# Patient Record
Sex: MALE | Race: WHITE | Employment: OTHER | URBAN - METROPOLITAN AREA
[De-identification: names, ages, dates, MRNs, and addresses within clinical notes are randomized per-mention and may not be internally consistent; named-entity substitution may affect disease eponyms.]

---

## 2017-01-06 ENCOUNTER — HOSPITAL ENCOUNTER (OUTPATIENT)
Dept: CT IMAGING | Age: 77
Discharge: HOME | End: 2017-01-06
Attending: RADIOLOGY
Payer: MEDICARE

## 2017-01-06 DIAGNOSIS — I71.40 AAA (ABDOMINAL AORTIC ANEURYSM): ICD-10-CM

## 2017-01-06 LAB — CREAT BLD-MCNC: 1.4 MG/DL (ref 0.6–1)

## 2017-01-06 PROCEDURE — 82565 ASSAY OF CREATININE: CPT

## 2017-01-06 PROCEDURE — 74011000258 HC RX REV CODE- 258: Performed by: RADIOLOGY

## 2017-01-06 PROCEDURE — 74174 CTA ABD&PLVS W/CONTRAST: CPT

## 2017-01-06 PROCEDURE — 74011636320 HC RX REV CODE- 636/320: Performed by: RADIOLOGY

## 2017-01-06 RX ORDER — SODIUM CHLORIDE 0.9 % (FLUSH) 0.9 %
10 SYRINGE (ML) INJECTION
Status: COMPLETED | OUTPATIENT
Start: 2017-01-06 | End: 2017-01-06

## 2017-01-06 RX ADMIN — SODIUM CHLORIDE 100 ML: 900 INJECTION, SOLUTION INTRAVENOUS at 10:57

## 2017-01-06 RX ADMIN — IOPAMIDOL 100 ML: 755 INJECTION, SOLUTION INTRAVENOUS at 10:57

## 2017-01-06 RX ADMIN — Medication 10 ML: at 10:57

## 2017-01-06 NOTE — PROGRESS NOTES
ORAL HYDRATION PROTOCOL INSTRUCTIONS GIVEN  It is recommended to discontinue metformin for 48hrs after patient receives I.V. Iodinated contrast for CT scan & to repeat creatnine level prior to restarting metformin. The  physician will need to order lab & instruct patient on when to resume metformin.

## 2017-06-16 ENCOUNTER — HOSPITAL ENCOUNTER (OUTPATIENT)
Dept: CT IMAGING | Age: 77
Discharge: HOME | End: 2017-06-16
Attending: RADIOLOGY
Payer: MEDICARE

## 2017-06-16 DIAGNOSIS — I71.40 AAA (ABDOMINAL AORTIC ANEURYSM): ICD-10-CM

## 2017-06-16 LAB — CREAT BLD-MCNC: 1.1 MG/DL (ref 0.6–1)

## 2017-06-16 PROCEDURE — 74011636320 HC RX REV CODE- 636/320: Performed by: RADIOLOGY

## 2017-06-16 PROCEDURE — 74011000258 HC RX REV CODE- 258: Performed by: RADIOLOGY

## 2017-06-16 PROCEDURE — 82565 ASSAY OF CREATININE: CPT

## 2017-06-16 PROCEDURE — 74174 CTA ABD&PLVS W/CONTRAST: CPT

## 2017-06-16 RX ORDER — SODIUM CHLORIDE 0.9 % (FLUSH) 0.9 %
10 SYRINGE (ML) INJECTION
Status: COMPLETED | OUTPATIENT
Start: 2017-06-16 | End: 2017-06-16

## 2017-06-16 RX ADMIN — SODIUM CHLORIDE 100 ML: 900 INJECTION, SOLUTION INTRAVENOUS at 12:34

## 2017-06-16 RX ADMIN — IOPAMIDOL 100 ML: 755 INJECTION, SOLUTION INTRAVENOUS at 12:34

## 2017-06-16 RX ADMIN — Medication 10 ML: at 12:34

## 2017-11-08 PROBLEM — M95.0 ACQUIRED DEFORMITY OF NOSE: Status: ACTIVE | Noted: 2017-08-30

## 2017-12-04 ENCOUNTER — HOSPITAL ENCOUNTER (OUTPATIENT)
Dept: LAB | Age: 77
Discharge: HOME | End: 2017-12-04
Payer: MEDICARE

## 2017-12-04 DIAGNOSIS — N18.2 CHRONIC KIDNEY DISEASE (CKD), STAGE II (MILD): ICD-10-CM

## 2017-12-04 PROBLEM — E78.2 MIXED HYPERLIPIDEMIA: Status: ACTIVE | Noted: 2017-12-04

## 2017-12-04 PROBLEM — Z95.0 BIVENTRICULAR CARDIAC PACEMAKER IN SITU: Status: ACTIVE | Noted: 2017-12-04

## 2017-12-04 PROBLEM — E11.9 TYPE 2 DIABETES MELLITUS WITHOUT COMPLICATION, WITHOUT LONG-TERM CURRENT USE OF INSULIN (HCC): Status: ACTIVE | Noted: 2017-12-04

## 2017-12-04 LAB
ANION GAP SERPL CALC-SCNC: 5 MMOL/L
BUN SERPL-MCNC: 18 MG/DL (ref 8–23)
CALCIUM SERPL-MCNC: 8.4 MG/DL (ref 8.3–10.4)
CHLORIDE SERPL-SCNC: 103 MMOL/L (ref 98–107)
CO2 SERPL-SCNC: 30 MMOL/L (ref 21–32)
CREAT SERPL-MCNC: 1.4 MG/DL (ref 0.8–1.5)
GLUCOSE SERPL-MCNC: 223 MG/DL (ref 65–100)
POTASSIUM SERPL-SCNC: 4 MMOL/L (ref 3.5–5.1)
SODIUM SERPL-SCNC: 138 MMOL/L (ref 136–145)

## 2017-12-04 PROCEDURE — 80048 BASIC METABOLIC PNL TOTAL CA: CPT | Performed by: INTERNAL MEDICINE

## 2017-12-04 PROCEDURE — 36415 COLL VENOUS BLD VENIPUNCTURE: CPT | Performed by: INTERNAL MEDICINE

## 2017-12-21 PROBLEM — E11.21 TYPE 2 DIABETES MELLITUS WITH NEPHROPATHY (HCC): Status: ACTIVE | Noted: 2017-12-21

## 2017-12-21 PROBLEM — R06.02 SOB (SHORTNESS OF BREATH): Status: ACTIVE | Noted: 2017-12-21

## 2017-12-26 ENCOUNTER — HOSPITAL ENCOUNTER (OUTPATIENT)
Dept: GENERAL RADIOLOGY | Age: 77
Discharge: HOME | End: 2017-12-26
Attending: INTERNAL MEDICINE
Payer: MEDICARE

## 2017-12-26 DIAGNOSIS — R06.02 SOB (SHORTNESS OF BREATH): ICD-10-CM

## 2017-12-26 PROCEDURE — 71020 XR CHEST PA LAT: CPT

## 2018-02-20 ENCOUNTER — HOSPITAL ENCOUNTER (OUTPATIENT)
Dept: GENERAL RADIOLOGY | Age: 78
Discharge: HOME | End: 2018-02-20
Payer: MEDICARE

## 2018-02-20 DIAGNOSIS — J40 BRONCHITIS: ICD-10-CM

## 2018-02-20 PROCEDURE — 71046 X-RAY EXAM CHEST 2 VIEWS: CPT

## 2018-06-13 ENCOUNTER — HOSPITAL ENCOUNTER (OUTPATIENT)
Dept: CT IMAGING | Age: 78
Discharge: HOME | End: 2018-06-13
Attending: RADIOLOGY
Payer: MEDICARE

## 2018-06-13 DIAGNOSIS — I71.40 ABDOMINAL AORTIC ANEURYSM: ICD-10-CM

## 2018-06-13 LAB — CREAT BLD-MCNC: 1.1 MG/DL (ref 0.8–1.5)

## 2018-06-13 PROCEDURE — 74174 CTA ABD&PLVS W/CONTRAST: CPT

## 2018-06-13 PROCEDURE — 74011636320 HC RX REV CODE- 636/320: Performed by: RADIOLOGY

## 2018-06-13 PROCEDURE — 74011000258 HC RX REV CODE- 258: Performed by: RADIOLOGY

## 2018-06-13 PROCEDURE — 82565 ASSAY OF CREATININE: CPT

## 2018-06-13 RX ORDER — SODIUM CHLORIDE 0.9 % (FLUSH) 0.9 %
10 SYRINGE (ML) INJECTION
Status: COMPLETED | OUTPATIENT
Start: 2018-06-13 | End: 2018-06-13

## 2018-06-13 RX ADMIN — SODIUM CHLORIDE 100 ML: 900 INJECTION, SOLUTION INTRAVENOUS at 09:35

## 2018-06-13 RX ADMIN — Medication 10 ML: at 09:35

## 2018-06-13 RX ADMIN — IOPAMIDOL 100 ML: 755 INJECTION, SOLUTION INTRAVENOUS at 09:35

## 2018-06-20 PROBLEM — J44.9 CHRONIC OBSTRUCTIVE PULMONARY DISEASE (HCC): Status: ACTIVE | Noted: 2018-06-20

## 2018-06-20 PROBLEM — Z95.0 PACEMAKER: Status: ACTIVE | Noted: 2018-06-20

## 2018-06-20 PROBLEM — E11.9 TYPE 2 DIABETES MELLITUS WITHOUT COMPLICATION, WITHOUT LONG-TERM CURRENT USE OF INSULIN (HCC): Status: RESOLVED | Noted: 2017-12-04 | Resolved: 2018-06-20

## 2018-10-25 PROBLEM — M48.062 SPINAL STENOSIS OF LUMBAR REGION WITH NEUROGENIC CLAUDICATION: Status: ACTIVE | Noted: 2018-10-25

## 2018-11-12 ENCOUNTER — HOSPITAL ENCOUNTER (OUTPATIENT)
Dept: INTERVENTIONAL RADIOLOGY/VASCULAR | Age: 78
Discharge: HOME | End: 2018-11-12
Attending: RADIOLOGY
Payer: MEDICARE

## 2018-11-12 VITALS
TEMPERATURE: 97.9 F | OXYGEN SATURATION: 94 % | RESPIRATION RATE: 16 BRPM | DIASTOLIC BLOOD PRESSURE: 69 MMHG | HEART RATE: 70 BPM | WEIGHT: 186 LBS | BODY MASS INDEX: 25.94 KG/M2 | SYSTOLIC BLOOD PRESSURE: 121 MMHG

## 2018-11-12 DIAGNOSIS — M54.50 LUMBAR BACK PAIN: ICD-10-CM

## 2018-11-12 LAB
GLUCOSE BLD STRIP.AUTO-MCNC: 185 MG/DL (ref 65–100)
INR BLD: 1.1 (ref 0.9–1.2)
PT BLD: 12.9 SECS (ref 9.6–11.6)

## 2018-11-12 PROCEDURE — 77030003666 HC NDL SPINAL BD -A

## 2018-11-12 PROCEDURE — 62323 NJX INTERLAMINAR LMBR/SAC: CPT

## 2018-11-12 PROCEDURE — 85610 PROTHROMBIN TIME: CPT

## 2018-11-12 PROCEDURE — 74011250636 HC RX REV CODE- 250/636: Performed by: RADIOLOGY

## 2018-11-12 PROCEDURE — 82962 GLUCOSE BLOOD TEST: CPT

## 2018-11-12 PROCEDURE — 74011636320 HC RX REV CODE- 636/320: Performed by: RADIOLOGY

## 2018-11-12 RX ORDER — METAXALONE 800 MG/1
800 TABLET ORAL AS NEEDED
COMMUNITY
End: 2019-05-28

## 2018-11-12 RX ORDER — METHYLPREDNISOLONE ACETATE 40 MG/ML
80 INJECTION, SUSPENSION INTRA-ARTICULAR; INTRALESIONAL; INTRAMUSCULAR; SOFT TISSUE ONCE
Status: COMPLETED | OUTPATIENT
Start: 2018-11-12 | End: 2018-11-12

## 2018-11-12 RX ORDER — LIDOCAINE HYDROCHLORIDE 10 MG/ML
1-20 INJECTION, SOLUTION EPIDURAL; INFILTRATION; INTRACAUDAL; PERINEURAL
Status: DISCONTINUED | OUTPATIENT
Start: 2018-11-12 | End: 2018-11-16 | Stop reason: HOSPADM

## 2018-11-12 RX ADMIN — METHYLPREDNISOLONE ACETATE 80 MG: 40 INJECTION, SUSPENSION INTRA-ARTICULAR; INTRALESIONAL; INTRAMUSCULAR; SOFT TISSUE at 14:38

## 2018-11-12 RX ADMIN — Medication 8 ML: at 14:34

## 2018-11-12 RX ADMIN — IOPAMIDOL 1 ML: 408 INJECTION, SOLUTION INTRATHECAL at 14:38

## 2018-11-12 NOTE — PROCEDURES
Department of Interventional Radiology  [phone]        Interventional Radiology Brief Procedure Note    Patient: Carine Dickens MRN: [id number]  SSN: [id number]    YOB: 1940  Age: [de-identified] y.o. Sex: male      Date of Procedure: 11/12/2018    Pre-Procedure Diagnosis: LBP w RLE radiculopathy. L45 DDD. Post-Procedure Diagnosis: SAME    Procedure(s): Epidural Steroid Injection    Brief Description of Procedure: R L45    Performed By: Ct Nino MD     Assistants: None    Anesthesia:Lidocaine    Estimated Blood Loss: None    Specimens:  None    Implants:  None    Findings: Good epidural contrast visualization. Complications: None    Recommendations: Discharge home. Follow Up: Repeat LUCIE in 2 weeks--scheduled. If no improvement, consider referral to Dr Kareen Santizo. May need a CT myelogram, eventually.       Signed By: Ct Nino MD     November 12, 2018

## 2018-11-12 NOTE — DISCHARGE INSTRUCTIONS
[address]  Department of Interventional Radiology  [facility]  [phone] Office  [phone] Fax    Steroid Injection Discharge Instructions    General Information:   A steroid injection was performed today, placing a combination of a steroid and an anesthetic (numbing medicine) into the space around the nerves of your spine. This is done to treat back pain. It may take 7-10 days for the injection to reach its full potential.  This procedure can be done at any level of the spinal column, depending on where your pain is. Your doctor will have ordered the appropriate level to be treated prior to your coming in for the procedure. Home Care Instructions: You can resume your regular diet. Do not drink alcohol. You may notice that you have to use your pain medications less after your injection. Some people do not notice much of a change in their pain after the first injection. If that is the case, it is worth your time to have a second one done. This is why these injections are sometimes ordered in a series of three. Keep the puncture site clean and dry for 24 hours, and then you may remove the dressing. Showering is acceptable after the bandage is removed. Call If:   You should call your Physician and/or the Radiology Nurse if you have any bleeding other than a small spot on your bandage. Call if you have any signs of infection, fever, increased pain at the puncture site, confusion, or a headache that worsens when you stand and eases when lying flat. Follow-Up Instructions:  Please see your ordering doctor as he/she has requested. Let your doctor know if you have relief from your pain so they may schedule another injection for you if it is indicated. To Reach Us: If you have any questions about your procedure, please call the Interventional Radiology department at [phone].  After business hours (5pm) and weekends, call the answering service at [phone] and ask for the Radiologist on call to be paged. Interventional Radiology General Nurse Discharge    After general anesthesia or intravenous sedation, for 24 hours or while taking prescription Narcotics:  · Limit your activities  · Do not drive and operate hazardous machinery  · Do not make important personal or business decisions  · Do  not drink alcoholic beverages  · If you have not urinated within 8 hours after discharge, please contact your surgeon on call. * Please give a list of your current medications to your Primary Care Provider. * Please update this list whenever your medications are discontinued, doses are     changed, or new medications (including over-the-counter products) are added. * Please carry medication information at all times in case of emergency situations. These are general instructions for a healthy lifestyle:    No smoking/ No tobacco products/ Avoid exposure to second hand smoke  Surgeon General's Warning:  Quitting smoking now greatly reduces serious risk to your health. Obesity, smoking, and sedentary lifestyle greatly increases your risk for illness  A healthy diet, regular physical exercise & weight monitoring are important for maintaining a healthy lifestyle    You may be retaining fluid if you have a history of heart failure or if you experience any of the following symptoms:  Weight gain of 3 pounds or more overnight or 5 pounds in a week, increased swelling in our hands or feet or shortness of breath while lying flat in bed. Please call your doctor as soon as you notice any of these symptoms; do not wait until your next office visit. Recognize signs and symptoms of STROKE:  F-face looks uneven    A-arms unable to move or move unevenly    S-speech slurred or non-existent    T-time-call 911 as soon as signs and symptoms begin-DO NOT go       Back to bed or wait to see if you get better-TIME IS BRAIN.              Patient Signature:  Date: 11/12/2018  Discharging Nurse: Jake Patel RN

## 2018-11-12 NOTE — PROGRESS NOTES
Patient is Alert and discharge instructions given and received. Patient assisted to car via wheel chair.

## 2018-11-26 ENCOUNTER — HOSPITAL ENCOUNTER (OUTPATIENT)
Dept: INTERVENTIONAL RADIOLOGY/VASCULAR | Age: 78
Discharge: HOME | End: 2018-11-26
Attending: RADIOLOGY
Payer: MEDICARE

## 2018-11-26 VITALS
RESPIRATION RATE: 18 BRPM | TEMPERATURE: 97.8 F | HEART RATE: 71 BPM | WEIGHT: 176 LBS | DIASTOLIC BLOOD PRESSURE: 64 MMHG | BODY MASS INDEX: 24.64 KG/M2 | OXYGEN SATURATION: 98 % | SYSTOLIC BLOOD PRESSURE: 134 MMHG | HEIGHT: 71 IN

## 2018-11-26 DIAGNOSIS — M54.50 LUMBAR BACK PAIN: ICD-10-CM

## 2018-11-26 LAB
INR BLD: 1.1 (ref 0.9–1.2)
PT BLD: 13.4 SECS (ref 9.6–11.6)

## 2018-11-26 PROCEDURE — 74011636320 HC RX REV CODE- 636/320: Performed by: RADIOLOGY

## 2018-11-26 PROCEDURE — 62323 NJX INTERLAMINAR LMBR/SAC: CPT

## 2018-11-26 PROCEDURE — 74011250636 HC RX REV CODE- 250/636: Performed by: RADIOLOGY

## 2018-11-26 PROCEDURE — 85610 PROTHROMBIN TIME: CPT

## 2018-11-26 PROCEDURE — 77030003666 HC NDL SPINAL BD -A

## 2018-11-26 RX ORDER — LIDOCAINE HYDROCHLORIDE 20 MG/ML
8-10 INJECTION, SOLUTION EPIDURAL; INFILTRATION; INTRACAUDAL; PERINEURAL ONCE
Status: COMPLETED | OUTPATIENT
Start: 2018-11-26 | End: 2018-11-26

## 2018-11-26 RX ORDER — METHYLPREDNISOLONE ACETATE 80 MG/ML
80 INJECTION, SUSPENSION INTRA-ARTICULAR; INTRALESIONAL; INTRAMUSCULAR; SOFT TISSUE ONCE
Status: COMPLETED | OUTPATIENT
Start: 2018-11-26 | End: 2018-11-26

## 2018-11-26 RX ADMIN — METHYLPREDNISOLONE ACETATE 80 MG: 80 INJECTION, SUSPENSION INTRA-ARTICULAR; INTRALESIONAL; INTRAMUSCULAR; SOFT TISSUE at 15:16

## 2018-11-26 RX ADMIN — LIDOCAINE HYDROCHLORIDE 10 ML: 20 INJECTION, SOLUTION EPIDURAL; INFILTRATION; INTRACAUDAL; PERINEURAL at 15:12

## 2018-11-26 RX ADMIN — IOPAMIDOL 0.5 ML: 408 INJECTION, SOLUTION INTRATHECAL at 15:15

## 2018-11-26 NOTE — PROCEDURES
Department of Interventional Radiology  [phone]        Interventional Radiology Brief Procedure Note    Patient: Cristian Menendez MRN: [id number]  SSN: [id number]    YOB: 1940  Age: [de-identified] y.o. Sex: male      Date of Procedure: 11/26/2018    Pre-Procedure Diagnosis: LBP w RLE radiculopathy. R L45 disc bulge. Post-Procedure Diagnosis: SAME    Procedure(s): Epidural Steroid Injection    Brief Description of Procedure: R L45. Performed By: Alia Prescott MD     Assistants: None    Anesthesia:Lidocaine    Estimated Blood Loss: None    Specimens:  None    Implants:  None    Findings: Good contrast opacification. Complications: None    Recommendations: Discharge home. Follow Up: Repeat LUCIE in about 2 weeks, if necessary.      Signed By: Alia Prescott MD     November 26, 2018

## 2018-12-11 ENCOUNTER — HOSPITAL ENCOUNTER (OUTPATIENT)
Dept: LAB | Age: 78
Discharge: HOME | End: 2018-12-11
Payer: MEDICARE

## 2018-12-11 DIAGNOSIS — R63.4 WEIGHT LOSS, NON-INTENTIONAL: ICD-10-CM

## 2018-12-11 PROBLEM — M54.16 LUMBAR RADICULOPATHY: Status: ACTIVE | Noted: 2018-12-11

## 2018-12-11 PROBLEM — I73.9 PERIPHERAL VASCULAR DISEASE (HCC): Status: ACTIVE | Noted: 2018-12-11

## 2018-12-11 LAB
ALBUMIN SERPL-MCNC: 3.6 G/DL (ref 3.2–4.6)
ALBUMIN/GLOB SERPL: 0.9 {RATIO} (ref 1.2–3.5)
ALP SERPL-CCNC: 67 U/L (ref 50–136)
ALT SERPL-CCNC: 21 U/L (ref 12–65)
ANION GAP SERPL CALC-SCNC: 6 MMOL/L (ref 7–16)
AST SERPL-CCNC: 16 U/L (ref 15–37)
BASOPHILS # BLD: 0 K/UL (ref 0–0.2)
BASOPHILS NFR BLD: 0 % (ref 0–2)
BILIRUB SERPL-MCNC: 0.5 MG/DL (ref 0.2–1.1)
BUN SERPL-MCNC: 24 MG/DL (ref 8–23)
CALCIUM SERPL-MCNC: 9.4 MG/DL (ref 8.3–10.4)
CHLORIDE SERPL-SCNC: 104 MMOL/L (ref 98–107)
CO2 SERPL-SCNC: 28 MMOL/L (ref 21–32)
CREAT SERPL-MCNC: 1.27 MG/DL (ref 0.8–1.5)
CRP SERPL-MCNC: 0.6 MG/DL (ref 0–0.9)
DIFFERENTIAL METHOD BLD: ABNORMAL
EOSINOPHIL # BLD: 0.2 K/UL (ref 0–0.8)
EOSINOPHIL NFR BLD: 4 % (ref 0.5–7.8)
ERYTHROCYTE [DISTWIDTH] IN BLOOD BY AUTOMATED COUNT: 13.6 % (ref 11.9–14.6)
ERYTHROCYTE [SEDIMENTATION RATE] IN BLOOD: 54 MM/HR (ref 0–20)
GLOBULIN SER CALC-MCNC: 3.8 G/DL (ref 2.3–3.5)
GLUCOSE SERPL-MCNC: 97 MG/DL (ref 65–100)
HCT VFR BLD AUTO: 37.1 % (ref 41.1–50.3)
HGB BLD-MCNC: 12.5 G/DL (ref 13.6–17.2)
IMM GRANULOCYTES # BLD: 0 K/UL (ref 0–0.5)
IMM GRANULOCYTES NFR BLD AUTO: 1 % (ref 0–5)
LYMPHOCYTES # BLD: 0.7 K/UL (ref 0.5–4.6)
LYMPHOCYTES NFR BLD: 15 % (ref 13–44)
MCH RBC QN AUTO: 32.7 PG (ref 26.1–32.9)
MCHC RBC AUTO-ENTMCNC: 33.7 G/DL (ref 31.4–35)
MCV RBC AUTO: 97.1 FL (ref 79.6–97.8)
MONOCYTES # BLD: 0.4 K/UL (ref 0.1–1.3)
MONOCYTES NFR BLD: 9 % (ref 4–12)
NEUTS SEG # BLD: 3.3 K/UL (ref 1.7–8.2)
NEUTS SEG NFR BLD: 71 % (ref 43–78)
NRBC # BLD: 0 K/UL (ref 0–0.2)
PLATELET # BLD AUTO: 154 K/UL (ref 150–450)
PMV BLD AUTO: 9 FL (ref 9.4–12.3)
POTASSIUM SERPL-SCNC: 3.7 MMOL/L (ref 3.5–5.1)
PROT SERPL-MCNC: 7.4 G/DL (ref 6.3–8.2)
RBC # BLD AUTO: 3.82 M/UL (ref 4.23–5.6)
SODIUM SERPL-SCNC: 138 MMOL/L (ref 136–145)
TSH SERPL DL<=0.005 MIU/L-ACNC: 1.51 UIU/ML (ref 0.36–3.74)
WBC # BLD AUTO: 4.7 K/UL (ref 4.3–11.1)

## 2018-12-11 PROCEDURE — 36415 COLL VENOUS BLD VENIPUNCTURE: CPT

## 2018-12-11 PROCEDURE — 86140 C-REACTIVE PROTEIN: CPT

## 2018-12-11 PROCEDURE — 80053 COMPREHEN METABOLIC PANEL: CPT

## 2018-12-11 PROCEDURE — 84443 ASSAY THYROID STIM HORMONE: CPT

## 2018-12-11 PROCEDURE — 85652 RBC SED RATE AUTOMATED: CPT

## 2018-12-11 PROCEDURE — 85025 COMPLETE CBC W/AUTO DIFF WBC: CPT

## 2018-12-12 NOTE — PROGRESS NOTES
Cbc and chemistry ok--one test of inflammation mildly up but is non specific-thyroid nl--repeat the sed rate downstairs in about 3 week

## 2018-12-14 ENCOUNTER — HOSPITAL ENCOUNTER (OUTPATIENT)
Dept: INTERVENTIONAL RADIOLOGY/VASCULAR | Age: 78
Discharge: HOME | End: 2018-12-14
Attending: RADIOLOGY

## 2018-12-14 PROBLEM — Z95.0 STATUS POST BIVENTRICULAR PACEMAKER: Status: ACTIVE | Noted: 2018-12-14

## 2018-12-17 ENCOUNTER — HOSPITAL ENCOUNTER (OUTPATIENT)
Dept: CT IMAGING | Age: 78
Discharge: HOME | End: 2018-12-17
Attending: NEUROLOGICAL SURGERY
Payer: MEDICARE

## 2018-12-17 ENCOUNTER — HOSPITAL ENCOUNTER (OUTPATIENT)
Dept: ULTRASOUND IMAGING | Age: 78
Discharge: HOME | End: 2018-12-17
Attending: RADIOLOGY
Payer: MEDICARE

## 2018-12-17 ENCOUNTER — HOSPITAL ENCOUNTER (OUTPATIENT)
Dept: INTERVENTIONAL RADIOLOGY/VASCULAR | Age: 78
Discharge: HOME | End: 2018-12-17
Attending: NEUROLOGICAL SURGERY
Payer: MEDICARE

## 2018-12-17 VITALS
TEMPERATURE: 98.2 F | SYSTOLIC BLOOD PRESSURE: 146 MMHG | HEART RATE: 74 BPM | RESPIRATION RATE: 14 BRPM | DIASTOLIC BLOOD PRESSURE: 76 MMHG | OXYGEN SATURATION: 93 %

## 2018-12-17 DIAGNOSIS — M54.16 LUMBAR RADICULOPATHY: ICD-10-CM

## 2018-12-17 DIAGNOSIS — I73.9 PERIPHERAL VASCULAR DISEASE (HCC): ICD-10-CM

## 2018-12-17 DIAGNOSIS — I71.40 AAA (ABDOMINAL AORTIC ANEURYSM): ICD-10-CM

## 2018-12-17 LAB
INR BLD: 1.3 (ref 0.9–1.2)
PT BLD: 15.5 SECS (ref 9.6–11.6)

## 2018-12-17 PROCEDURE — 77030003666 HC NDL SPINAL BD -A

## 2018-12-17 PROCEDURE — 62304 MYELOGRAPHY LUMBAR INJECTION: CPT

## 2018-12-17 PROCEDURE — 77030014143 HC TY PUNC LUMBR BD -A

## 2018-12-17 PROCEDURE — 85610 PROTHROMBIN TIME: CPT

## 2018-12-17 PROCEDURE — 74011636320 HC RX REV CODE- 636/320: Performed by: RADIOLOGY

## 2018-12-17 PROCEDURE — 74011250636 HC RX REV CODE- 250/636: Performed by: RADIOLOGY

## 2018-12-17 PROCEDURE — 93978 VASCULAR STUDY: CPT

## 2018-12-17 PROCEDURE — 72132 CT LUMBAR SPINE W/DYE: CPT

## 2018-12-17 RX ORDER — LIDOCAINE HYDROCHLORIDE 20 MG/ML
8-10 INJECTION, SOLUTION EPIDURAL; INFILTRATION; INTRACAUDAL; PERINEURAL ONCE
Status: COMPLETED | OUTPATIENT
Start: 2018-12-17 | End: 2018-12-17

## 2018-12-17 RX ADMIN — LIDOCAINE HYDROCHLORIDE 10 ML: 20 INJECTION, SOLUTION EPIDURAL; INFILTRATION; INTRACAUDAL; PERINEURAL at 13:29

## 2018-12-17 RX ADMIN — IOPAMIDOL 15 ML: 408 INJECTION, SOLUTION INTRATHECAL at 13:30

## 2018-12-17 NOTE — PROGRESS NOTES
Patient is Alert and discharge instructions given and received. Patient being prepared for discharge at this time.

## 2018-12-17 NOTE — PROGRESS NOTES
TRANSFER - OUT REPORT:    Verbal report given to Jeanette Lozoya RN (name) on Kristy Lora  being transferred to IR Prep / Recovery (unit) for routine post - op       Report consisted of patients Situation, Background, Assessment and   Recommendations(SBAR). Information from the following report(s) SBAR, Kardex, Procedure Summary and MAR was reviewed with the receiving nurse. Lines:       Opportunity for questions and clarification was provided.

## 2018-12-17 NOTE — DISCHARGE INSTRUCTIONS
[address]  Department of Interventional Radiology  [address] [place] [address] [address] [facility]  [phone] Office  [phone] Fax  POST LUMBAR PUNCTURE/MYELOGRAM/INTRATHECAL CHEMOTHERAPY DISCHARGE INSTRUCTIONS  General Information:  Lumbar Puncture: A LP is done to help diagnose several disorders, like pseudo tumor, migraines, meningitis, and multiple sclerosis. It involves a puncture (usually in the lower spine) into the sac that protects the spinal column. A sample of the fluid in that space is removed and tested in the lab. Myelogram:   A Myelogram involves a lumbar puncture, and instead of removing fluid, contrast will be injected into the sac surrounding the spinal column. It is done to visualize the spinal column, nerve roots, spinal canal, vertebral discs and disc space. It is usually done to diagnose back pain with unknown cause or in preparation for surgery. After the injection, a CT scan will be done, usually within two hours of the injection. Intrathecal Chemotherapy:   Chemotherapy can be given in many forms. Intrathecal chemo involves a lumbar puncture, and instead of removing fluid, the chemo will be injected into the space. After any of these procedures, you will be asked to lie flat on your back for 4-6 hours to prevent complications. You should also rest for 24 hours after you go home, and force fluids. If you have a headache, you should take Tylenol or acetaminophen. Call If:   You should call your Physician and/or the Radiology Nurse if you develop a headache that is not relieved by Tylenol, and worsens when you stand and eases when you lie down, you need to call. You may have developed what is referred to as a spinal headache. Our physicians will probably advise you to be on strict bed rest for 24 hours, to drink lots of fluids and caffeine. If this does not help the head pain, call again the next day.  You should call if you have bleeding other than a small spot on your bandage. You should call if you have any numbness, tingling, weakness, fever, chills, urinary retention, severe itching, rash, welts, swelling, or confusion. Follow-up Instructions: See the doctor who ordered your procedure as he/she has instructed. If you had a Lumbar Puncture or Myelogram, your results should be available to your ordering doctor in 3-5 business days. You can remove your dressing in 24 hours and shower regularly. Do not bathe or swim for 72 hours. To Reach Us: If you have any questions about your procedure, please call the Interventional Radiology department at [phone]. After business hours (5pm) and weekends, call the answering service at [phone] and ask for the Radiologist on call to be paged. Interventional Radiology General Nurse Discharge    After general anesthesia or intravenous sedation, for 24 hours or while taking prescription Narcotics:  · Limit your activities  · Do not drive and operate hazardous machinery  · Do not make important personal or business decisions  · Do  not drink alcoholic beverages  · If you have not urinated within 8 hours after discharge, please contact your surgeon on call. * Please give a list of your current medications to your Primary Care Provider. * Please update this list whenever your medications are discontinued, doses are     changed, or new medications (including over-the-counter products) are added. * Please carry medication information at all times in case of emergency situations. These are general instructions for a healthy lifestyle:    No smoking/ No tobacco products/ Avoid exposure to second hand smoke  Surgeon General's Warning:  Quitting smoking now greatly reduces serious risk to your health.     Obesity, smoking, and sedentary lifestyle greatly increases your risk for illness  A healthy diet, regular physical exercise & weight monitoring are important for maintaining a healthy lifestyle    You may be retaining fluid if you have a history of heart failure or if you experience any of the following symptoms:  Weight gain of 3 pounds or more overnight or 5 pounds in a week, increased swelling in our hands or feet or shortness of breath while lying flat in bed. Please call your doctor as soon as you notice any of these symptoms; do not wait until your next office visit. Recognize signs and symptoms of STROKE:  F-face looks uneven    A-arms unable to move or move unevenly    S-speech slurred or non-existent    T-time-call 911 as soon as signs and symptoms begin-DO NOT go       Back to bed or wait to see if you get better-TIME IS BRAIN.       Patient Signature:  Date: 12/17/2018  Discharging Nurse: Amy Brannon RN

## 2018-12-20 PROBLEM — M51.26 HNP (HERNIATED NUCLEUS PULPOSUS), LUMBAR: Status: ACTIVE | Noted: 2018-12-20

## 2019-01-07 ENCOUNTER — ANESTHESIA EVENT (OUTPATIENT)
Dept: SURGERY | Age: 79
End: 2019-01-07
Payer: MEDICARE

## 2019-01-07 ENCOUNTER — HOSPITAL ENCOUNTER (OUTPATIENT)
Dept: LAB | Age: 79
Discharge: HOME | End: 2019-01-07
Payer: MEDICARE

## 2019-01-07 ENCOUNTER — HOSPITAL ENCOUNTER (OUTPATIENT)
Dept: SURGERY | Age: 79
Discharge: HOME | End: 2019-01-07
Payer: MEDICARE

## 2019-01-07 VITALS
SYSTOLIC BLOOD PRESSURE: 145 MMHG | DIASTOLIC BLOOD PRESSURE: 80 MMHG | BODY MASS INDEX: 24.94 KG/M2 | OXYGEN SATURATION: 94 % | WEIGHT: 178.13 LBS | TEMPERATURE: 97.5 F | HEIGHT: 71 IN | RESPIRATION RATE: 16 BRPM | HEART RATE: 80 BPM

## 2019-01-07 DIAGNOSIS — D64.9 ANEMIA, UNSPECIFIED TYPE: ICD-10-CM

## 2019-01-07 DIAGNOSIS — R77.1 ELEVATED SERUM GLOBULIN LEVEL: ICD-10-CM

## 2019-01-07 LAB
BACTERIA SPEC CULT: NORMAL
EST. AVERAGE GLUCOSE BLD GHB EST-MCNC: 148 MG/DL
GLUCOSE BLD STRIP.AUTO-MCNC: 133 MG/DL (ref 65–100)
HBA1C MFR BLD: 6.8 % (ref 4.8–6)
INR PPP: 1.9
POTASSIUM SERPL-SCNC: 4.2 MMOL/L (ref 3.5–5.1)
PROTHROMBIN TIME: 21.1 SEC (ref 11.7–14.5)
SERVICE CMNT-IMP: NORMAL

## 2019-01-07 PROCEDURE — 87641 MR-STAPH DNA AMP PROBE: CPT

## 2019-01-07 PROCEDURE — 85610 PROTHROMBIN TIME: CPT

## 2019-01-07 PROCEDURE — 36415 COLL VENOUS BLD VENIPUNCTURE: CPT

## 2019-01-07 PROCEDURE — 83036 HEMOGLOBIN GLYCOSYLATED A1C: CPT

## 2019-01-07 PROCEDURE — 84165 PROTEIN E-PHORESIS SERUM: CPT

## 2019-01-07 PROCEDURE — 86334 IMMUNOFIX E-PHORESIS SERUM: CPT

## 2019-01-07 PROCEDURE — 82962 GLUCOSE BLOOD TEST: CPT

## 2019-01-07 PROCEDURE — 84132 ASSAY OF SERUM POTASSIUM: CPT

## 2019-01-07 NOTE — PERIOP NOTES
Patient verified name, , and surgery as listed in [facility]. Patient provided medical/health information and PTA medications to the best of their ability. TYPE  CASE: 1B Orders per surgeon: Orders received in EHR and procedure confirmed. Labs per surgeon: MRSA/MSSA and Hgb A1c. Results: pending Labs per anesthesia protocol: Potassium, POC glucose, and PT/INR. Joe Watt Results pending EKG:  EKG 18,Pacer interr 10.24.18/ ECHO 8.9.16, Stress 12.15.17 Last Card note 18- all in [facility]. Cardiac Clearance/ Coumadin Clearance on chart and under letter tab in [facility]. Copy of pacemaker card on chart. POC glucose  133 . Instructed Patient that if blood sugar 300 or > , surgery may be cancelled. Pt voice understanding. SN instruct pt to contact [phone] for any complications. Nasal Swab collected per MD order and instructions for Mupirocin nasal ointment if required. Patient provided with and instructed on education handouts including Guide to Surgery, blood transfusions, pain management, and hand hygiene for the family and community, and [facility] brochure. Road to Recovery Spine surgery patient guide given. Instructed on incentive spirometry with return demonstration. Long handled prehab sponge given with instructions for use. Patient viewed spine prehab video. Hibiclens and instructions given per hospital policy. Instructed patient to continue previous medications as prescribed prior to surgery unless otherwise directed and to take the following medications the day of surgery according to anesthesia guidelines : Coreg . Instructed patient to hold  the following medications on the day of surgery: Coumadin 5 days-all other vitamins, supplements, and NSAIDs. Original medication prescription bottles were not visualized during patient appointment. Medication profile updated and reviewed with patient. Patient teach back successful and patient demonstrates knowledge of instruction. #[id number] notified pt with pacemaker.

## 2019-01-07 NOTE — PERIOP NOTES
Labs reviewed. Potassium and INR WNL per anesthesia protocol. MRSA/MSSA negative. Hgb A1c: 6.8 -all labs routed to Dr Porter Listen office.

## 2019-01-07 NOTE — PERIOP NOTES
Recent Results (from the past 12 hour(s)) MSSA/MRSA SC BY PCR, NASAL SWAB Collection Time: 01/07/19 11:42 AM  
Result Value Ref Range Special Requests: NO SPECIAL REQUESTS Culture result:     
  SA target not detected. A MRSA NEGATIVE, SA NEGATIVE test result does not preclude MRSA or SA nasal colonization. GLUCOSE, POC Collection Time: 01/07/19 11:44 AM  
Result Value Ref Range Glucose (POC) 133 (H) 65 - 100 mg/dL HEMOGLOBIN A1C WITH EAG Collection Time: 01/07/19 11:52 AM  
Result Value Ref Range Hemoglobin A1c 6.8 (H) 4.8 - 6.0 % Est. average glucose 148 mg/dL POTASSIUM Collection Time: 01/07/19 11:52 AM  
Result Value Ref Range Potassium 4.2 3.5 - 5.1 mmol/L PROTHROMBIN TIME + INR Collection Time: 01/07/19 11:52 AM  
Result Value Ref Range Prothrombin time 21.1 (H) 11.7 - 14.5 sec INR 1.9

## 2019-01-08 LAB
ALBUMIN SERPL ELPH-MCNC: 3.86 G/DL (ref 3.2–5.6)
ALBUMIN/GLOB SERPL: 1.2 {RATIO}
ALPHA1 GLOB SERPL ELPH-MCNC: 0.25 G/DL (ref 0.1–0.4)
ALPHA2 GLOB SERPL ELPH-MCNC: 0.76 G/DL (ref 0.4–1.2)
B-GLOBULIN SERPL QL ELPH: 1.1 G/DL (ref 0.6–1.3)
GAMMA GLOB MFR SERPL ELPH: 1.04 G/DL (ref 0.5–1.6)
IGA SERPL-MCNC: 495 MG/DL (ref 85–499)
IGG SERPL-MCNC: 937 MG/DL (ref 610–1616)
IGM SERPL-MCNC: 62 MG/DL (ref 35–242)
M PROTEIN SERPL ELPH-MCNC: NORMAL G/DL
PROT PATTERN SERPL ELPH-IMP: NORMAL
PROT PATTERN SPEC IFE-IMP: NORMAL
PROT SERPL-MCNC: 7 G/DL (ref 6.3–8.2)

## 2019-01-11 ENCOUNTER — HOSPITAL ENCOUNTER (OUTPATIENT)
Age: 79
Setting detail: OUTPATIENT SURGERY
Discharge: HOME | End: 2019-01-11
Attending: NEUROLOGICAL SURGERY | Admitting: NEUROLOGICAL SURGERY
Payer: MEDICARE

## 2019-01-11 ENCOUNTER — ANESTHESIA (OUTPATIENT)
Dept: SURGERY | Age: 79
End: 2019-01-11
Payer: MEDICARE

## 2019-01-11 ENCOUNTER — APPOINTMENT (OUTPATIENT)
Dept: GENERAL RADIOLOGY | Age: 79
End: 2019-01-11
Attending: NEUROLOGICAL SURGERY
Payer: MEDICARE

## 2019-01-11 VITALS
WEIGHT: 181.2 LBS | SYSTOLIC BLOOD PRESSURE: 172 MMHG | HEART RATE: 66 BPM | HEIGHT: 71 IN | BODY MASS INDEX: 25.37 KG/M2 | DIASTOLIC BLOOD PRESSURE: 78 MMHG | OXYGEN SATURATION: 93 % | TEMPERATURE: 98.2 F | RESPIRATION RATE: 17 BRPM

## 2019-01-11 DIAGNOSIS — M54.16 LUMBAR RADICULOPATHY: ICD-10-CM

## 2019-01-11 LAB
GLUCOSE BLD STRIP.AUTO-MCNC: 156 MG/DL (ref 65–100)
INR BLD: 1 (ref 0.9–1.2)
PT BLD: 12.3 SECS (ref 9.6–11.6)

## 2019-01-11 PROCEDURE — 74011000250 HC RX REV CODE- 250

## 2019-01-11 PROCEDURE — 77030013951 HC SEAL TISS ADH DURASL KT INLC -G1: Performed by: NEUROLOGICAL SURGERY

## 2019-01-11 PROCEDURE — 77030033269 HC SLV COMPR SCD KNE2 CARD -B: Performed by: NEUROLOGICAL SURGERY

## 2019-01-11 PROCEDURE — 72020 X-RAY EXAM OF SPINE 1 VIEW: CPT

## 2019-01-11 PROCEDURE — 74011000272 HC RX REV CODE- 272: Performed by: NEUROLOGICAL SURGERY

## 2019-01-11 PROCEDURE — 74011250636 HC RX REV CODE- 250/636: Performed by: NEUROLOGICAL SURGERY

## 2019-01-11 PROCEDURE — 77030037088 HC TUBE ENDOTRACH ORAL NSL COVD-A: Performed by: NURSE ANESTHETIST, CERTIFIED REGISTERED

## 2019-01-11 PROCEDURE — 85610 PROTHROMBIN TIME: CPT

## 2019-01-11 PROCEDURE — 77030016570 HC BLNKT BAIR HGGR 3M -B: Performed by: NURSE ANESTHETIST, CERTIFIED REGISTERED

## 2019-01-11 PROCEDURE — 77030020782 HC GWN BAIR PAWS FLX 3M -B: Performed by: NURSE ANESTHETIST, CERTIFIED REGISTERED

## 2019-01-11 PROCEDURE — 74011250636 HC RX REV CODE- 250/636

## 2019-01-11 PROCEDURE — 76060000034 HC ANESTHESIA 1.5 TO 2 HR: Performed by: NEUROLOGICAL SURGERY

## 2019-01-11 PROCEDURE — 77030028270 HC SRGFL HEMSTAT MTRX J&J -C: Performed by: NEUROLOGICAL SURGERY

## 2019-01-11 PROCEDURE — 77030019557 HC ELECTRD VES SEAL MEDT -F: Performed by: NEUROLOGICAL SURGERY

## 2019-01-11 PROCEDURE — 77030032490 HC SLV COMPR SCD KNE COVD -B: Performed by: NEUROLOGICAL SURGERY

## 2019-01-11 PROCEDURE — 77030018390 HC SPNG HEMSTAT2 J&J -B: Performed by: NEUROLOGICAL SURGERY

## 2019-01-11 PROCEDURE — 74011250637 HC RX REV CODE- 250/637: Performed by: ANESTHESIOLOGY

## 2019-01-11 PROCEDURE — 77030039267 HC ADH SKN EXOFIN S2SG -B: Performed by: NEUROLOGICAL SURGERY

## 2019-01-11 PROCEDURE — 77030003029 HC SUT VCRL J&J -B: Performed by: NEUROLOGICAL SURGERY

## 2019-01-11 PROCEDURE — 74011250636 HC RX REV CODE- 250/636: Performed by: ANESTHESIOLOGY

## 2019-01-11 PROCEDURE — 77030019908 HC STETH ESOPH SIMS -A: Performed by: NURSE ANESTHETIST, CERTIFIED REGISTERED

## 2019-01-11 PROCEDURE — 74011000250 HC RX REV CODE- 250: Performed by: NEUROLOGICAL SURGERY

## 2019-01-11 PROCEDURE — 76010000162 HC OR TIME 1.5 TO 2 HR INTENSV-TIER 1: Performed by: NEUROLOGICAL SURGERY

## 2019-01-11 PROCEDURE — 77030030163 HC BN WAX J&J -A: Performed by: NEUROLOGICAL SURGERY

## 2019-01-11 PROCEDURE — 76210000021 HC REC RM PH II 0.5 TO 1 HR: Performed by: NEUROLOGICAL SURGERY

## 2019-01-11 PROCEDURE — 76210000006 HC OR PH I REC 0.5 TO 1 HR: Performed by: NEUROLOGICAL SURGERY

## 2019-01-11 PROCEDURE — 77030012894: Performed by: NEUROLOGICAL SURGERY

## 2019-01-11 PROCEDURE — 77030002700 HC GRFT DURA SUTRBL INLC -D: Performed by: NEUROLOGICAL SURGERY

## 2019-01-11 PROCEDURE — 77030018836 HC SOL IRR NACL ICUM -A: Performed by: NEUROLOGICAL SURGERY

## 2019-01-11 PROCEDURE — 77030039425 HC BLD LARYNG TRULITE DISP TELE -A: Performed by: NURSE ANESTHETIST, CERTIFIED REGISTERED

## 2019-01-11 PROCEDURE — 74011250637 HC RX REV CODE- 250/637: Performed by: NEUROLOGICAL SURGERY

## 2019-01-11 PROCEDURE — 77030012935 HC DRSG AQUACEL BMS -B: Performed by: NEUROLOGICAL SURGERY

## 2019-01-11 PROCEDURE — 82962 GLUCOSE BLOOD TEST: CPT

## 2019-01-11 DEVICE — DURAGEN® DURAL GRAFT MATRIX 1PK, 2X2 DOM
Type: IMPLANTABLE DEVICE | Site: SPINE LUMBAR | Status: FUNCTIONAL
Brand: DURAGEN®

## 2019-01-11 RX ORDER — FENTANYL CITRATE 50 UG/ML
INJECTION, SOLUTION INTRAMUSCULAR; INTRAVENOUS AS NEEDED
Status: DISCONTINUED | OUTPATIENT
Start: 2019-01-11 | End: 2019-01-11 | Stop reason: HOSPADM

## 2019-01-11 RX ORDER — SODIUM CHLORIDE 9 MG/ML
25 INJECTION, SOLUTION INTRAVENOUS CONTINUOUS
Status: DISCONTINUED | OUTPATIENT
Start: 2019-01-11 | End: 2019-01-11 | Stop reason: HOSPADM

## 2019-01-11 RX ORDER — GLYCOPYRROLATE 0.2 MG/ML
INJECTION INTRAMUSCULAR; INTRAVENOUS AS NEEDED
Status: DISCONTINUED | OUTPATIENT
Start: 2019-01-11 | End: 2019-01-11 | Stop reason: HOSPADM

## 2019-01-11 RX ORDER — ROCURONIUM BROMIDE 10 MG/ML
INJECTION, SOLUTION INTRAVENOUS AS NEEDED
Status: DISCONTINUED | OUTPATIENT
Start: 2019-01-11 | End: 2019-01-11 | Stop reason: HOSPADM

## 2019-01-11 RX ORDER — NALOXONE HYDROCHLORIDE 0.4 MG/ML
0.1 INJECTION, SOLUTION INTRAMUSCULAR; INTRAVENOUS; SUBCUTANEOUS AS NEEDED
Status: DISCONTINUED | OUTPATIENT
Start: 2019-01-11 | End: 2019-01-11 | Stop reason: HOSPADM

## 2019-01-11 RX ORDER — DEXAMETHASONE SODIUM PHOSPHATE 4 MG/ML
INJECTION, SOLUTION INTRA-ARTICULAR; INTRALESIONAL; INTRAMUSCULAR; INTRAVENOUS; SOFT TISSUE AS NEEDED
Status: DISCONTINUED | OUTPATIENT
Start: 2019-01-11 | End: 2019-01-11 | Stop reason: HOSPADM

## 2019-01-11 RX ORDER — SODIUM CHLORIDE 0.9 % (FLUSH) 0.9 %
5-40 SYRINGE (ML) INJECTION AS NEEDED
Status: DISCONTINUED | OUTPATIENT
Start: 2019-01-11 | End: 2019-01-11 | Stop reason: HOSPADM

## 2019-01-11 RX ORDER — LIDOCAINE HYDROCHLORIDE AND EPINEPHRINE 10; 10 MG/ML; UG/ML
INJECTION, SOLUTION INFILTRATION; PERINEURAL AS NEEDED
Status: DISCONTINUED | OUTPATIENT
Start: 2019-01-11 | End: 2019-01-11 | Stop reason: HOSPADM

## 2019-01-11 RX ORDER — FENTANYL CITRATE 50 UG/ML
100 INJECTION, SOLUTION INTRAMUSCULAR; INTRAVENOUS ONCE
Status: DISCONTINUED | OUTPATIENT
Start: 2019-01-11 | End: 2019-01-11 | Stop reason: HOSPADM

## 2019-01-11 RX ORDER — HYDROCODONE BITARTRATE AND ACETAMINOPHEN 7.5; 325 MG/1; MG/1
1 TABLET ORAL
Qty: 20 TAB | Refills: 0 | Status: SHIPPED | OUTPATIENT
Start: 2019-01-11 | End: 2019-01-24 | Stop reason: ALTCHOICE

## 2019-01-11 RX ORDER — HYDROCODONE BITARTRATE AND ACETAMINOPHEN 7.5; 325 MG/1; MG/1
1 TABLET ORAL AS NEEDED
Status: DISCONTINUED | OUTPATIENT
Start: 2019-01-11 | End: 2019-01-11 | Stop reason: HOSPADM

## 2019-01-11 RX ORDER — OXYCODONE HYDROCHLORIDE 5 MG/1
5 TABLET ORAL
Status: COMPLETED | OUTPATIENT
Start: 2019-01-11 | End: 2019-01-11

## 2019-01-11 RX ORDER — NEOSTIGMINE METHYLSULFATE 1 MG/ML
INJECTION INTRAVENOUS AS NEEDED
Status: DISCONTINUED | OUTPATIENT
Start: 2019-01-11 | End: 2019-01-11 | Stop reason: HOSPADM

## 2019-01-11 RX ORDER — SODIUM CHLORIDE 0.9 % (FLUSH) 0.9 %
5-40 SYRINGE (ML) INJECTION EVERY 8 HOURS
Status: DISCONTINUED | OUTPATIENT
Start: 2019-01-11 | End: 2019-01-11 | Stop reason: HOSPADM

## 2019-01-11 RX ORDER — FAMOTIDINE 20 MG/1
20 TABLET, FILM COATED ORAL ONCE
Status: COMPLETED | OUTPATIENT
Start: 2019-01-11 | End: 2019-01-11

## 2019-01-11 RX ORDER — ONDANSETRON 2 MG/ML
INJECTION INTRAMUSCULAR; INTRAVENOUS AS NEEDED
Status: DISCONTINUED | OUTPATIENT
Start: 2019-01-11 | End: 2019-01-11 | Stop reason: HOSPADM

## 2019-01-11 RX ORDER — PROPOFOL 10 MG/ML
INJECTION, EMULSION INTRAVENOUS AS NEEDED
Status: DISCONTINUED | OUTPATIENT
Start: 2019-01-11 | End: 2019-01-11 | Stop reason: HOSPADM

## 2019-01-11 RX ORDER — SODIUM CHLORIDE, SODIUM LACTATE, POTASSIUM CHLORIDE, CALCIUM CHLORIDE 600; 310; 30; 20 MG/100ML; MG/100ML; MG/100ML; MG/100ML
100 INJECTION, SOLUTION INTRAVENOUS CONTINUOUS
Status: DISCONTINUED | OUTPATIENT
Start: 2019-01-11 | End: 2019-01-11 | Stop reason: HOSPADM

## 2019-01-11 RX ORDER — LIDOCAINE HYDROCHLORIDE 10 MG/ML
0.1 INJECTION INFILTRATION; PERINEURAL AS NEEDED
Status: DISCONTINUED | OUTPATIENT
Start: 2019-01-11 | End: 2019-01-11 | Stop reason: HOSPADM

## 2019-01-11 RX ORDER — CEFAZOLIN SODIUM/WATER 2 G/20 ML
2 SYRINGE (ML) INTRAVENOUS ONCE
Status: COMPLETED | OUTPATIENT
Start: 2019-01-11 | End: 2019-01-11

## 2019-01-11 RX ORDER — HYDROMORPHONE HYDROCHLORIDE 2 MG/ML
0.5 INJECTION, SOLUTION INTRAMUSCULAR; INTRAVENOUS; SUBCUTANEOUS
Status: DISCONTINUED | OUTPATIENT
Start: 2019-01-11 | End: 2019-01-11 | Stop reason: HOSPADM

## 2019-01-11 RX ORDER — MIDAZOLAM HYDROCHLORIDE 1 MG/ML
2 INJECTION, SOLUTION INTRAMUSCULAR; INTRAVENOUS
Status: DISCONTINUED | OUTPATIENT
Start: 2019-01-11 | End: 2019-01-11 | Stop reason: HOSPADM

## 2019-01-11 RX ORDER — LIDOCAINE HYDROCHLORIDE 20 MG/ML
INJECTION, SOLUTION EPIDURAL; INFILTRATION; INTRACAUDAL; PERINEURAL AS NEEDED
Status: DISCONTINUED | OUTPATIENT
Start: 2019-01-11 | End: 2019-01-11 | Stop reason: HOSPADM

## 2019-01-11 RX ADMIN — DEXAMETHASONE SODIUM PHOSPHATE 4 MG: 4 INJECTION, SOLUTION INTRA-ARTICULAR; INTRALESIONAL; INTRAMUSCULAR; INTRAVENOUS; SOFT TISSUE at 15:12

## 2019-01-11 RX ADMIN — LIDOCAINE HYDROCHLORIDE 100 MG: 20 INJECTION, SOLUTION EPIDURAL; INFILTRATION; INTRACAUDAL; PERINEURAL at 14:50

## 2019-01-11 RX ADMIN — ROCURONIUM BROMIDE 30 MG: 10 INJECTION, SOLUTION INTRAVENOUS at 14:54

## 2019-01-11 RX ADMIN — Medication 3 AMPULE: at 12:34

## 2019-01-11 RX ADMIN — SODIUM CHLORIDE, SODIUM LACTATE, POTASSIUM CHLORIDE, AND CALCIUM CHLORIDE: 600; 310; 30; 20 INJECTION, SOLUTION INTRAVENOUS at 14:30

## 2019-01-11 RX ADMIN — Medication 2 G: at 15:02

## 2019-01-11 RX ADMIN — FENTANYL CITRATE 25 MCG: 50 INJECTION, SOLUTION INTRAMUSCULAR; INTRAVENOUS at 15:08

## 2019-01-11 RX ADMIN — ROCURONIUM BROMIDE 20 MG: 10 INJECTION, SOLUTION INTRAVENOUS at 15:10

## 2019-01-11 RX ADMIN — PROPOFOL 100 MG: 10 INJECTION, EMULSION INTRAVENOUS at 14:53

## 2019-01-11 RX ADMIN — FENTANYL CITRATE 50 MCG: 50 INJECTION, SOLUTION INTRAMUSCULAR; INTRAVENOUS at 14:53

## 2019-01-11 RX ADMIN — OXYCODONE HYDROCHLORIDE 5 MG: 5 TABLET ORAL at 17:37

## 2019-01-11 RX ADMIN — ONDANSETRON 4 MG: 2 INJECTION INTRAMUSCULAR; INTRAVENOUS at 15:12

## 2019-01-11 RX ADMIN — GLYCOPYRROLATE 0.4 MG: 0.2 INJECTION INTRAMUSCULAR; INTRAVENOUS at 15:58

## 2019-01-11 RX ADMIN — FAMOTIDINE 20 MG: 20 TABLET ORAL at 12:34

## 2019-01-11 RX ADMIN — NEOSTIGMINE METHYLSULFATE 3 MG: 1 INJECTION INTRAVENOUS at 15:58

## 2019-01-11 RX ADMIN — SODIUM CHLORIDE, SODIUM LACTATE, POTASSIUM CHLORIDE, AND CALCIUM CHLORIDE: 600; 310; 30; 20 INJECTION, SOLUTION INTRAVENOUS at 15:30

## 2019-01-11 RX ADMIN — FENTANYL CITRATE 25 MCG: 50 INJECTION, SOLUTION INTRAMUSCULAR; INTRAVENOUS at 15:52

## 2019-01-11 NOTE — ANESTHESIA PREPROCEDURE EVALUATION
Anesthetic History Review of Systems / Medical History Patient summary reviewed Pulmonary COPD Smoker (Former) Neuro/Psych  
 
 
 
TIA Cardiovascular Hypertension Valvular problems/murmurs (moderate): mitral insufficiency and aortic insufficiency Dysrhythmias : atrial fibrillation Pacemaker (2005), CAD, PAD (AAA s/p EVAR), CABG ( 2006) and hyperlipidemia Exercise tolerance: <4 METS Comments: Perfusion scan 2/2017: 
CONCLUSION:  
1. Stress EKG: Normal.  
2. SPECT Perfusion Imaging: Normal Perfusion. 3. LV Systolic Function is  normal.  
4. Risk Assessment:  Normal. 
 
Hx Ischemic cardiomyopathy GI/Hepatic/Renal 
  
 
 
Renal disease: CRI Endo/Other Diabetes: type 2 Other Findings Physical Exam 
 
Airway Mallampati: II 
 
Neck ROM: decreased range of motion Mouth opening: Normal 
 
 Cardiovascular Rhythm: regular Murmur: Grade 2, Mitral area Dental 
No notable dental hx Pulmonary Breath sounds clear to auscultation Abdominal 
 
 
 
 Other Findings Anesthetic Plan ASA: 3 Anesthesia type: general 
 
 
 
 
 
Anesthetic plan and risks discussed with: Patient

## 2019-01-11 NOTE — ANESTHESIA POSTPROCEDURE EVALUATION
Procedure(s): RIGHT L4 5 LAMINECTOMY AND FACECECTOMY . Anesthesia Post Evaluation Patient location during evaluation: PACU Patient participation: complete - patient participated Level of consciousness: awake and alert Pain management: adequate Airway patency: patent Anesthetic complications: no 
Cardiovascular status: acceptable Respiratory status: acceptable Hydration status: acceptable Post anesthesia nausea and vomiting:  none Visit Vitals /77 Pulse (!) 53 Temp 36.7 °C (98 °F) Resp 22 Ht 5' 11\" (1.803 m) Wt 82.2 kg (181 lb 3.2 oz) SpO2 99% BMI 25.27 kg/m²

## 2019-01-11 NOTE — DISCHARGE INSTRUCTIONS
[facility], [address], [place], [address]  [phone]    Postoperative Home Instructions  Lumbar (Back) Surgery    · Showering: You may shower the first day you are home with the dressing on. If the dressing becomes saturated, change it completely to a similar dressing. Leave the steri-strips or glue in place. After 3 days, you may take the dressing off and leave it off. If you have the brown aquacel dressing, leave it alone for 5 days and then you may remove the dressing. Do not soak in a tub, and use only soap and water on the wound. · Wound Care: A small to moderate amount of reddish drainage on the dressing is normal the first 1-2 days after surgery. If the dressing becomes saturated, change it. Once the steri-strips begin to peel up on their own, you may gently take them off. Large amounts of clear, watery drainage is not normal and you should call our office immediately. · Signs of Infection: Extreme tenderness at the wound, excessive redness and/or swelling, or ugly yellowish-greenish drainage from the wound. Fever greater than 100.5 may be present. If you think you have a wound infection, call our office immediately. · Driving: You may not begin driving until after your visit to our office for a wound and suture check which is normally 7-10 days after you come home from the hospital.    · Medications: You should take anti-inflammatory medications such as Motrin, Celebrex for 30 days after surgery, every day, on a regular schedule only if prescribed by your physician. The pain medicine prescribed may be taken as needed. You should take a stool softener (Colace) twice a day, drink lots of water and eat high fiber foods to avoid constipation (this is a common problem with pain medicine.)    · Deep Breathing Exercises: Continue to do your incentive spirometry and/or deep breathing exercises to prevent risk of pneumonia.     · Smoking: YOU MAY NOT SMOKE! Smoking will interfere with your healing. If you smoke, you may end up with having another surgery or more problems! · Activity: No heavy lifting for 4 weeks after surgery. This means anything heavier than a coffee cup or newspaper. After 4 weeks, you may gradually begin lifting heavier things. Avoid bending, stooping, or twisting at the waist.  Do not lie on your stomach to sleep. · You may remove your David hose when consistently walking. You may do steps and inclines in moderation. · Sexual Relations: You may resume sexual relations 2 weeks after your surgery. · Walking Program: You should begin walking every day on the first day after surgery. Start for short distances, then go a little farther each day. You should eventually walk 1-2 miles every day for the long term. This is very important in your recovery period because walking strengthens the spinal muscles and will help protect your disc and vertebrae. · Symptoms after Surgery: Dont be alarmed if you still have some symptoms after surgery. The nerves often require time to heal after the pressure has been taken off. Be patient, you should see improvement with time. · Follow-up: You will need to call our office for an appointment to see a nurse one week after surgery for a wound check. An appointment will be made then for you to see your surgeon about 4 weeks after surgery. Please call our office if you have any other questions or problems. Listen to your body; it will tell you if you are overdoing it. Use common sense and take care of yourself!      After general anesthesia or intravenous sedation, for 24 hours or while taking prescription Narcotics:  · Limit your activities  · Do not drive and operate hazardous machinery  · Do not make important personal or business decisions  · Do  not drink alcoholic beverages  · If you have not urinated within 8 hours after discharge, please contact your surgeon on call.    *  Please give a list of your current medications to your Primary Care Provider. *  Please update this list whenever your medications are discontinued, doses are      changed, or new medications (including over-the-counter products) are added. *  Please carry medication information at all times in case of emergency situations. These are general instructions for a healthy lifestyle:  No smoking/ No tobacco products/ Avoid exposure to second hand smoke  Surgeon General's Warning:  Quitting smoking now greatly reduces serious risk to your health. Obesity, smoking, and sedentary lifestyle greatly increases your risk for illness  A healthy diet, regular physical exercise & weight monitoring are important for maintaining a healthy lifestyle    Recognize signs and symptoms of STROKE:  F-face looks uneven  A-arms unable to move or move unevenly  S-speech slurred or non-existent  T-time-call 911 as soon as signs and symptoms begin-DO NOT go       Back to bed or wait to see if you get better-TIME IS BRAIN.

## 2019-01-11 NOTE — OP NOTES
[facility] REPORT    Name:Don SORTOerfield  MR#: [id number]  : 1940  ACCOUNT #: [de-identified]   DATE OF SERVICE: 2019    PREOPERATIVE DIAGNOSIS:  Lumbar stenosis with right L5 radiculopathy. POSTOPERATIVE DIAGNOSES:  1. Lumbar stenosis with right L5 radiculopathy. 2.  Myelogram related cerebrospinal fluid leak. PROCEDURES PERFORMED:  1. Right L4-L5 laminectomy, foraminotomy, and facetectomy. 2.  Repair of myelogram related CSF leak. SURGEON:  Loren Nunez MD    FIRST ASSISTANT:  None. ANESTHESIA:  General endotracheal.    ESTIMATED BLOOD LOSS:  Minimal.    PREPARATION:  ChloraPrep. COMPLICATIONS:  None. SPECIMENS REMOVED:  None. IMPLANTS:  None. HISTORY OF PRESENT ILLNESS:  An [de-identified]year-old gentleman with right lower extremity neurogenic claudication and partial foot drop refractory to conservative measures. CT myelography confirmed right-sided stenosis at L4-5. The patient was admitted for surgery as conservative measures have failed. OPERATIVE NOTE:  The patient was brought to the operating room and was carefully placed under general endotracheal anesthesia without complications. He was carefully turned prone on the Cloward frame. The posterior aspect of the back was prepped in the usual sterile fashion. An incision was made overlying L4 and L5. Muscles were stripped in the right lateral subperiosteal plane with cautery and elevators and deep retractors were placed. Lateral lumbar spine x-ray confirmed entry point at L4-5. Next, laminectomy and facetectomy were carried out with 3 and 4 mm Kerrison rongeurs. Significant bony and ligamentous hypertrophy were present. The ligamentum was removed over the central portion of the dura. A small pinhole in the dorsal aspect between the interspinous ligament was noted and it was leaking some CSF.   This was the location of the patient's previous lumbar myelogram.  This area was widely decompressed until no CSF leakage was seen. Positive pressure breathing did produce a small amount of CSF leak from this area, but however, once this was stopped, there was no leakage. Distal foraminotomy and medial facetectomy were widely decompressed at the L5 nerve root. A ball tip probe was passed under the nerve root and on top of the disk space, and no disk fragments or extrusions were seen or palpated. No further compression was noted. A small piece of Duragen was placed over the exposed small pinhole and this was covered with DuraSeal.  The wound was irrigated until clear and Surgiflo was placed. No further bleeding was encountered and the wound was closed. The fascia was closed tightly with 0 Vicryl. 0.5% Marcaine with epinephrine was used to infiltrate the muscle for postoperative analgesia. Subcutaneous tissues were closed with 3-0 Vicryl. Skin was closed with Exofin, tape, and pressure dressing. The patient tolerated the procedure well and was turned supine, awakened, extubated, and taken to PACU in stable condition. There were no obvious complications. DISCHARGE INSTRUCTIONS:  DIET:  Regular. ACTIVITY:  As tolerated. No heavy lifting, bending or automobile driving at this time. Showers are permissible but no baths. The patient will contact the physician if he develops any fever, drainage, swelling, cellulitis or neurological change. Return visit in 10-14 days for wound check and suture removal.    DISCHARGE MEDICATIONS:  Include admission medicines plus Norco 7.5/325 q. 8 hours p.r.n. DISCHARGE CONDITION:  Satisfactory.       MD SEB Dill / [place]  D: 01/11/2019 16:08     T: 01/11/2019 17:09  JOB #: [id number]

## 2019-06-18 PROBLEM — G62.9 NEUROPATHY: Status: ACTIVE | Noted: 2019-06-18

## 2019-06-18 PROBLEM — I34.0 NON-RHEUMATIC MITRAL REGURGITATION: Status: ACTIVE | Noted: 2019-06-18

## 2019-10-01 PROBLEM — C61 PROSTATE CANCER (HCC): Status: ACTIVE | Noted: 2019-10-01

## 2021-03-10 PROBLEM — R30.0 DYSURIA: Status: ACTIVE | Noted: 2021-03-10

## 2021-03-10 PROBLEM — N41.9 PROSTATITIS: Status: ACTIVE | Noted: 2021-03-10

## 2021-04-30 PROBLEM — Z95.0 PACEMAKER: Status: RESOLVED | Noted: 2018-06-20 | Resolved: 2021-04-30

## 2021-06-18 PROBLEM — Z79.01 LONG TERM CURRENT USE OF ANTICOAGULANT: Status: ACTIVE | Noted: 2021-06-18

## 2021-08-03 PROBLEM — I10 ESSENTIAL HYPERTENSION: Status: RESOLVED | Noted: 2021-08-03 | Resolved: 2021-08-03

## 2021-09-02 ENCOUNTER — HOSPITAL ENCOUNTER (OUTPATIENT)
Dept: GENERAL RADIOLOGY | Age: 81
Discharge: HOME | End: 2021-09-02
Payer: MEDICARE

## 2021-09-02 DIAGNOSIS — R06.02 SOB (SHORTNESS OF BREATH): ICD-10-CM

## 2021-09-02 PROCEDURE — 71046 X-RAY EXAM CHEST 2 VIEWS: CPT

## 2021-09-09 ENCOUNTER — HOSPITAL ENCOUNTER (OUTPATIENT)
Dept: CT IMAGING | Age: 81
Discharge: HOME | End: 2021-09-09
Attending: INTERNAL MEDICINE
Payer: MEDICARE

## 2021-09-09 DIAGNOSIS — J98.4 RESTRICTIVE LUNG DISEASE: ICD-10-CM

## 2021-09-09 DIAGNOSIS — J44.9 CHRONIC OBSTRUCTIVE PULMONARY DISEASE, UNSPECIFIED COPD TYPE (HCC): ICD-10-CM

## 2021-09-09 DIAGNOSIS — R06.09 DYSPNEA ON EXERTION: ICD-10-CM

## 2021-09-09 PROCEDURE — 71250 CT THORAX DX C-: CPT

## 2021-12-17 ENCOUNTER — HOSPITAL ENCOUNTER (OUTPATIENT)
Dept: CT IMAGING | Age: 81
Discharge: HOME | End: 2021-12-17
Attending: INTERNAL MEDICINE
Payer: MEDICARE

## 2021-12-17 DIAGNOSIS — R93.89 ABNORMAL CT OF THE CHEST: ICD-10-CM

## 2021-12-17 DIAGNOSIS — R91.8 GROUND GLASS OPACITY PRESENT ON IMAGING OF LUNG: ICD-10-CM

## 2021-12-17 PROCEDURE — 71250 CT THORAX DX C-: CPT

## 2021-12-27 NOTE — PROGRESS NOTES
Nodule is some bigger but it is ground glass -Klever Gomez can you let him know bx may be needed and check vo or elb to see if they can get to nodule- if not may need ir to look at

## 2022-01-05 NOTE — PROGRESS NOTES
Attempted to call with pre procedure information  Call forwarded to voice mail with no attached name

## 2022-01-06 ENCOUNTER — HOSPITAL ENCOUNTER (OUTPATIENT)
Age: 82
Setting detail: OUTPATIENT SURGERY
Discharge: HOME | End: 2022-01-06
Attending: INTERNAL MEDICINE | Admitting: INTERNAL MEDICINE
Payer: MEDICARE

## 2022-01-06 ENCOUNTER — APPOINTMENT (OUTPATIENT)
Dept: GENERAL RADIOLOGY | Age: 82
End: 2022-01-06
Attending: INTERNAL MEDICINE
Payer: MEDICARE

## 2022-01-06 VITALS
BODY MASS INDEX: 26.6 KG/M2 | TEMPERATURE: 98.2 F | SYSTOLIC BLOOD PRESSURE: 156 MMHG | HEART RATE: 78 BPM | DIASTOLIC BLOOD PRESSURE: 72 MMHG | RESPIRATION RATE: 22 BRPM | WEIGHT: 190 LBS | OXYGEN SATURATION: 97 % | HEIGHT: 71 IN

## 2022-01-06 DIAGNOSIS — R91.1 LUNG NODULE: ICD-10-CM

## 2022-01-06 LAB
GLUCOSE BLD STRIP.AUTO-MCNC: 157 MG/DL (ref 65–100)
SERVICE CMNT-IMP: ABNORMAL

## 2022-01-06 PROCEDURE — 82962 GLUCOSE BLOOD TEST: CPT

## 2022-01-06 PROCEDURE — 88173 CYTOPATH EVAL FNA REPORT: CPT

## 2022-01-06 PROCEDURE — 74011000250 HC RX REV CODE- 250: Performed by: INTERNAL MEDICINE

## 2022-01-06 PROCEDURE — 31627 NAVIGATIONAL BRONCHOSCOPY: CPT | Performed by: INTERNAL MEDICINE

## 2022-01-06 PROCEDURE — 77030031405: Performed by: INTERNAL MEDICINE

## 2022-01-06 PROCEDURE — 99152 MOD SED SAME PHYS/QHP 5/>YRS: CPT | Performed by: INTERNAL MEDICINE

## 2022-01-06 PROCEDURE — 99153 MOD SED SAME PHYS/QHP EA: CPT | Performed by: INTERNAL MEDICINE

## 2022-01-06 PROCEDURE — 31654 BRONCH EBUS IVNTJ PERPH LES: CPT | Performed by: INTERNAL MEDICINE

## 2022-01-06 PROCEDURE — 2709999900 HC NON-CHARGEABLE SUPPLY: Performed by: INTERNAL MEDICINE

## 2022-01-06 PROCEDURE — 77030003406 HC NDL ASPIR BIOP OCOA -C: Performed by: INTERNAL MEDICINE

## 2022-01-06 PROCEDURE — 31653 BRONCH EBUS SAMPLNG 3/> NODE: CPT | Performed by: INTERNAL MEDICINE

## 2022-01-06 PROCEDURE — 77030031420 HC NDL TIP BRSH ASP SD SPDM -B: Performed by: INTERNAL MEDICINE

## 2022-01-06 PROCEDURE — 31628 BRONCHOSCOPY/LUNG BX EACH: CPT | Performed by: INTERNAL MEDICINE

## 2022-01-06 PROCEDURE — 77030021922 HC FCPS BIOP SD DISP SPDM -D: Performed by: INTERNAL MEDICINE

## 2022-01-06 PROCEDURE — 88333 PATH CONSLTJ SURG CYTO XM 1: CPT

## 2022-01-06 PROCEDURE — 31623 DX BRONCHOSCOPE/BRUSH: CPT | Performed by: INTERNAL MEDICINE

## 2022-01-06 PROCEDURE — 77030031404 HC PTCH SENS SD DISP SPDM -A: Performed by: INTERNAL MEDICINE

## 2022-01-06 PROCEDURE — 74011250636 HC RX REV CODE- 250/636: Performed by: INTERNAL MEDICINE

## 2022-01-06 PROCEDURE — 77030028571 HC CATH ENDOBRNCH DISP BIOP KT SPMD -G1: Performed by: INTERNAL MEDICINE

## 2022-01-06 PROCEDURE — 76040000027: Performed by: INTERNAL MEDICINE

## 2022-01-06 PROCEDURE — 88334 PATH CONSLTJ SURG CYTO XM EA: CPT

## 2022-01-06 PROCEDURE — 31624 DX BRONCHOSCOPE/LAVAGE: CPT | Performed by: INTERNAL MEDICINE

## 2022-01-06 PROCEDURE — 88305 TISSUE EXAM BY PATHOLOGIST: CPT

## 2022-01-06 PROCEDURE — 88112 CYTOPATH CELL ENHANCE TECH: CPT

## 2022-01-06 PROCEDURE — 77030012699 HC VLV SUC CNTRL OCOA -A: Performed by: INTERNAL MEDICINE

## 2022-01-06 PROCEDURE — 88177 CYTP FNA EVAL EA ADDL: CPT

## 2022-01-06 PROCEDURE — 77030009046 HC CATH BRNCH BLLN OCOA -B: Performed by: INTERNAL MEDICINE

## 2022-01-06 PROCEDURE — 88172 CYTP DX EVAL FNA 1ST EA SITE: CPT

## 2022-01-06 RX ORDER — LIDOCAINE HYDROCHLORIDE 20 MG/ML
JELLY TOPICAL ONCE
Status: COMPLETED | OUTPATIENT
Start: 2022-01-06 | End: 2022-01-06

## 2022-01-06 RX ORDER — FENTANYL CITRATE 50 UG/ML
25-100 INJECTION, SOLUTION INTRAMUSCULAR; INTRAVENOUS
Status: DISCONTINUED | OUTPATIENT
Start: 2022-01-06 | End: 2022-01-06 | Stop reason: HOSPADM

## 2022-01-06 RX ORDER — MIDAZOLAM HYDROCHLORIDE 1 MG/ML
.25-5 INJECTION, SOLUTION INTRAMUSCULAR; INTRAVENOUS
Status: DISCONTINUED | OUTPATIENT
Start: 2022-01-06 | End: 2022-01-06 | Stop reason: HOSPADM

## 2022-01-06 RX ORDER — LIDOCAINE HYDROCHLORIDE 40 MG/ML
SOLUTION TOPICAL ONCE
Status: COMPLETED | OUTPATIENT
Start: 2022-01-06 | End: 2022-01-06

## 2022-01-06 RX ORDER — NALOXONE HYDROCHLORIDE 0.4 MG/ML
0.4 INJECTION, SOLUTION INTRAMUSCULAR; INTRAVENOUS; SUBCUTANEOUS
Status: DISCONTINUED | OUTPATIENT
Start: 2022-01-06 | End: 2022-01-06 | Stop reason: HOSPADM

## 2022-01-06 RX ORDER — FLUMAZENIL 0.1 MG/ML
0.2 INJECTION INTRAVENOUS
Status: DISCONTINUED | OUTPATIENT
Start: 2022-01-06 | End: 2022-01-06 | Stop reason: HOSPADM

## 2022-01-06 RX ORDER — SODIUM CHLORIDE 9 MG/ML
1000 INJECTION, SOLUTION INTRAVENOUS CONTINUOUS
Status: DISCONTINUED | OUTPATIENT
Start: 2022-01-06 | End: 2022-01-06 | Stop reason: HOSPADM

## 2022-01-06 RX ADMIN — MIDAZOLAM 1 MG: 1 INJECTION INTRAMUSCULAR; INTRAVENOUS at 09:05

## 2022-01-06 RX ADMIN — SODIUM CHLORIDE 1000 ML: 900 INJECTION, SOLUTION INTRAVENOUS at 07:39

## 2022-01-06 RX ADMIN — FENTANYL CITRATE 100 MCG: 50 INJECTION, SOLUTION INTRAMUSCULAR; INTRAVENOUS at 08:31

## 2022-01-06 RX ADMIN — MIDAZOLAM 3 MG: 1 INJECTION INTRAMUSCULAR; INTRAVENOUS at 08:31

## 2022-01-06 RX ADMIN — LIDOCAINE HYDROCHLORIDE: 40 SOLUTION TOPICAL at 08:31

## 2022-01-06 RX ADMIN — FENTANYL CITRATE 50 MCG: 50 INJECTION, SOLUTION INTRAMUSCULAR; INTRAVENOUS at 08:59

## 2022-01-06 RX ADMIN — LIDOCAINE HYDROCHLORIDE: 20 JELLY TOPICAL at 08:31

## 2022-01-06 RX ADMIN — FENTANYL CITRATE 50 MCG: 50 INJECTION, SOLUTION INTRAMUSCULAR; INTRAVENOUS at 09:13

## 2022-01-06 NOTE — H&P
Shani Kirkpatrick MD   Physician   Specialty:  Pulmonary Disease   Progress Notes      Signed   Encounter Date:  9/2/2021                         []Hide copied text    []uKsh for details    [facility]: Patient Office Visit Note  [address] [facility]. [address] [facility], [address] [facility]  [phone]     Patient Name:  Andreina Akins  YOB: 1940            Date of Service:  9/2/2021         Chief Complaint   Patient presents with    COPD         History of Present Illness: This is an 78-year-old white male with a history of COPD, hypertension, diabetes, atrial fibrillation, coronary artery disease, prostate cancer, abdominal aortic aneurysm we are seeing consultation for Dr. Paras Baldwin regarding shortness of breath. Patient states he has been short of breath for 5 to 10 years but symptoms have progressed. A few years ago he was evaluated in [facility] by Dr. Tee Kelly and was diagnosed with COPD. He has been placed on Trelegy. Since that time he says he is not improved. He does get short of breath when walking uphill to go get his mail, when he is dressing, yard work. He is able to walk up 1 flight of stairs before having having to stop and rest.  He does some yard work and does get short of breath without it as well. He can walk about 1 block on flat ground. In addition to the Trelegy does use rescue inhaler and occasionally uses albuterol nebulizer. He says he does not feel that any of these things help all that much.   Is a former smoker smoking from age 10 to age 74 accumulating about 48 pack years of smoking.          Past Medical History:   Diagnosis Date    AAA (abdominal aortic aneurysm) (HCC)       leakage from stent repaired     Ambulates with cane       single cane    Anticoagulated on Coumadin       pt instructed to hold x 5 days     Aortic regurgitation 08/09/2016     mild to moderate per echo     Arthritis      Asthma      Atrial fibrillation (HCC)       permanent- Pacemaker    Cardiac pacemaker 07/20/2016     medtronic - copy of card on chart- pacer interr 7.25.18-negative for pacer dependent    Chronic kidney disease (CKD), stage II (mild)       pt denies    Chronic obstructive pulmonary disease (HCC)       inhalers    Coronary atherosclerosis of native coronary vessel       Followed by [facility]    Essential hypertension       managed well with meds    Former smoker       35 yrs, 1 pk per day. quit 2006    Hearing impairment       hearing aids to sea ears    History of AAA (abdominal aortic aneurysm) repair 2014     EVAR    History of prostate cancer 2003     treated with radiation    History of TIA (transient ischemic attack) 2004     no residual    [place] (hard of hearing)       sea hearing aides    Hx of CABG       6 vessel    Hyperlipidemia      Ischemic cardiomyopathy      Lower back pain      Mitral regurgitation 08/09/2016     moderate per echo     Skin abnormalities      Type 2 diabetes mellitus (HCC)       oral agent/AVG BS: 120/no s.s of hypoglycemia/Last A1c:6.0    Vision abnormalities                     Problem List  Date Reviewed: 9/2/2021         Codes Class Noted     Long term current use of anticoagulant ICD-10-CM: Z79.01  ICD-9-CM: V58.61   6/18/2021           Prostatitis ICD-10-CM: N41.9  ICD-9-CM: 601. 9   3/10/2021           Dysuria ICD-10-CM: R30.0  ICD-9-CM: 754. 1   3/10/2021           Prostate cancer ([facility] ICD-10-CM: C61  ICD-9-CM: 185   10/1/2019           Neuropathy ICD-10-CM: G62.9  ICD-9-CM: 608. 9   6/18/2019           Non-rheumatic mitral regurgitation ICD-10-CM: I34.0  ICD-9-CM: 424.0   6/18/2019           HNP (herniated nucleus pulposus), lumbar ICD-10-CM: M51.26  ICD-9-CM: 722.10   12/20/2018           Status post biventricular pacemaker ICD-10-CM: Z95.0  ICD-9-CM: V45.01   12/14/2018           Peripheral vascular disease ([facility] ICD-10-CM: I73.9  ICD-9-CM: 443. 9   12/11/2018           Lumbar radiculopathy ICD-10-CM: M54.16  ICD-9-CM: 505. 4   12/11/2018           Lumbar stenosis with neurogenic claudication ICD-10-CM: M48.062  ICD-9-CM: 724.03   10/25/2018           Chronic obstructive pulmonary disease ([facility] ICD-10-CM: J44.9  ICD-9-CM: 496   6/20/2018           Type 2 diabetes mellitus with nephropathy ([facility] ICD-10-CM: E11.21  ICD-9-CM: 250.40, 583.81   12/21/2017           SOB (shortness of breath) ICD-10-CM: R06.02  ICD-9-CM: 786.05   12/21/2017           Mixed hyperlipidemia ICD-10-CM: E78.2  ICD-9-CM: 272.2   12/4/2017           Biventricular cardiac pacemaker in situ ICD-10-CM: Z95.0  ICD-9-CM: V45.01   12/4/2017           Acquired deformity of nose ICD-10-CM: M95.0  ICD-9-CM: 478. 0   8/30/2017           Essential hypertension with goal blood pressure less than 130/85 ICD-10-CM: I10  ICD-9-CM: 401. 9   8/1/2016           Atrial flutter, chronic (HCC) ICD-10-CM: I48.92  ICD-9-CM: 427.32   8/1/2016           S/P AAA (abdominal aortic aneurysm) repair ICD-10-CM: F93.877, Z86.79  ICD-9-CM: V45.89   8/1/2016           Cardiac pacemaker ICD-10-CM: Z95.0  ICD-9-CM: V45.01   7/20/2016           Hx of CABG ICD-10-CM: Z95.1  ICD-9-CM: V45.81   7/20/2016     Overview Signed 7/20/2016  9:17 AM by Marky Clark       OV: 12/12/14  CABG x 4 2006. 4/4 patent grafts 2/2012.                    Abdominal aortic aneurysm without rupture ([facility] ICD-10-CM: I71.4  ICD-9-CM: 441. 4   6/7/2016           Ischemic cardiomyopathy ICD-10-CM: I25.5  ICD-9-CM: 414.8   5/9/2016     Overview Signed 5/9/2016  9:27 AM by Soniya Nelson EF 45% on echo 11/13                 Hyperlipidemia ICD-10-CM: C54.7  ICD-9-CM: 272.4   5/9/2016           Chronic kidney disease (CKD), stage II (mild) ICD-10-CM: N18.2  ICD-9-CM: 596. 2   5/9/2016           Transient ischemic attack ICD-10-CM: G45.9  ICD-9-CM: 435. 9   5/9/2016           Atherosclerosis of native coronary artery of native heart without angina pectoris ICD-10-CM: I25.10  ICD-9-CM: 414.01   2015           Personal history of tobacco use ICD-10-CM: Z87.891  ICD-9-CM: V15.82   2015           Diabetes ([facility] ICD-10-CM: E11.9  ICD-9-CM: 250.00   2015           Atrial fibrillation ([facility] ICD-10-CM: I48.91  ICD-9-CM: 427.31   2015     Overview Signed 2016  9:28 AM by Vincent Saenz       Chronic atrial flutter                               Past Surgical History:   Procedure Laterality Date    HX AAA REPAIR        EVAR- currently has leakage. attempt to repair in 2016    HX CATARACT REMOVAL Bilateral      HX COLONOSCOPY        HX CORONARY ARTERY BYPASS GRAFT        x 6 vessels    HX LUMBAR LAMINECTOMY   2019     right L4-5 lami for stenosis and CSF leak repair    HX PACEMAKER   02/15/2012    HX PACEMAKER PLACEMENT            Social History            Socioeconomic History    Marital status:        Spouse name: Not on file    Number of children: Not on file    Years of education: Not on file    Highest education level: Not on file   Occupational History    Not on file   Tobacco Use    Smoking status: Former Smoker       Packs/day: 1.00       Years: 35.00       Pack years: 35.00       Types: Cigarettes       Quit date: 2007       Years since quittin.6    Smokeless tobacco: Never Used   Substance and Sexual Activity    Alcohol use: Yes       Alcohol/week: 24.0 standard drinks       Types: 10 Glasses of wine, 14 Standard drinks or equivalent per week    Drug use: No    Sexual activity: Not on file   Other Topics Concern    Not on file   Social History Narrative      and lives with wife. He is originally from [address] [place] [address], but has lived here since . 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   1 dog.      Social Determinants of Health          Financial Resource Strain:     Difficulty of Paying Living Expenses:    Food Insecurity:     Worried About Running Out of Food in the Last Year:     Ran Out of Food in the Last Year:    Transportation Needs:     Lack of Transportation (Medical):  Lack of Transportation (Non-Medical):    Physical Activity:     Days of Exercise per Week:     Minutes of Exercise per Session:    Stress:     Feeling of Stress :    Social Connections:     Frequency of Communication with Friends and Family:     Frequency of Social Gatherings with Friends and Family:     Attends Christianity Services:     Active Member of Clubs or Organizations:     Attends Club or Organization Meetings:     Marital Status:    Intimate Partner Violence:     Fear of Current or Ex-Partner:     Emotionally Abused:     Physically Abused:     Sexually Abused:                Family History   Problem Relation Age of Onset    Asthma Father      Pneumonia Father      Heart Disease Mother      Diabetes Mother      Gall Bladder Disease Mother      Cancer Maternal Grandmother      Diabetes Brother           Allergies   Allergen Reactions    Levaquin [Levofloxacin] Other (comments)       Pt denies    Quinolones Unknown (comments)       Pt denies               REVIEW OF SYSTEMS:     Review of Systems   Constitutional: Negative for chills, diaphoresis, fever, malaise/fatigue and weight loss. HENT: Positive for hearing loss. Negative for congestion, ear discharge, ear pain, nosebleeds, sinus pain, sore throat and tinnitus. Eyes: Negative for blurred vision, pain and redness. Respiratory: Positive for shortness of breath and wheezing. Negative for cough, hemoptysis and sputum production. Cardiovascular: Negative for chest pain, palpitations, orthopnea, leg swelling and PND. Gastrointestinal: Negative for abdominal pain, blood in stool, constipation, diarrhea, heartburn, melena, nausea and vomiting. Genitourinary: Negative for dysuria, flank pain, frequency, hematuria and urgency. Musculoskeletal: Negative for back pain, joint pain and neck pain.    Skin: Negative for itching and rash. Neurological: Negative for dizziness, tremors, focal weakness, seizures and headaches. Endo/Heme/Allergies: Negative for environmental allergies. Does not bruise/bleed easily. Psychiatric/Behavioral: Positive for memory loss. Negative for depression, hallucinations, substance abuse and suicidal ideas. The patient is not nervous/anxious. All other systems reviewed and are negative.        Current Outpatient Medications:     apixaban (Eliquis) 5 mg tablet, Take 5 mg by mouth two (2) times a day., Disp: , Rfl:     traZODone (DESYREL) 50 mg tablet, Take 50 mg by mouth nightly., Disp: , Rfl:     lovastatin (MEVACOR) 40 mg tablet, Take 1 Tab by mouth daily. , Disp: 90 Tab, Rfl: 4    lisinopriL (PRINIVIL, ZESTRIL) 30 mg tablet, Take 1 Tab by mouth daily. , Disp: 90 Tab, Rfl: 4    metFORMIN ER (GLUCOPHAGE XR) 500 mg tablet, Take 2 Tabs by mouth daily (with dinner). , Disp: 180 Tab, Rfl: 4    dilTIAZem ER (CARDIZEM CD) 240 mg capsule, Take 1 Cap by mouth daily. , Disp: 90 Cap, Rfl: 4    hydroCHLOROthiazide (HYDRODIURIL) 25 mg tablet, Take 1 Tab by mouth daily. , Disp: 90 Tab, Rfl: 4    glipiZIDE (GLUCOTROL) 5 mg tablet, Take 0.5 Tabs by mouth two (2) times a day. (Patient taking differently: Take 2.5 mg by mouth daily.), Disp: 90 Tab, Rfl: 4    carvediloL (COREG) 25 mg tablet, Take 1 Tab by mouth two (2) times a day., Disp: 180 Tab, Rfl: 4    albuterol sulfate 90 mcg/actuation aebs, Take  by inhalation. , Disp: , Rfl:     fluticasone-umeclidinium-vilanterol (TRELEGY ELLIPTA) 100-62.5-25 mcg inhaler, Take 1 Puff by inhalation daily. , Disp: 1 Inhaler, Rfl: 3    warfarin (COUMADIN) 2 mg tablet, Take 1 Tablet by mouth daily.  take 4mg a night the first four nights  then 2mg a night with 4mg on Tuesdays and Thursdays (Patient not taking: Reported on 9/2/2021), Disp: 180 Tablet, Rfl: 3  OBJECTIVE:  Physical Exam:  Visit Vitals  BP (!) 147/81   Pulse (!) 101   Temp 97.2 °F (36.2 °C) (Skin)   Resp 14 Ht 5' 11\" (1.803 m)   Wt 192 lb (87.1 kg)   SpO2 93%   BMI 26.78 kg/m²                     GENERAL APPEARANCE:              The patient is normal weight and in no respiratory distress.                 HEENT:              PERRL. Conjunctivae unremarkable. Nasal mucosa is without epistaxis, exudate, or polyps. Gums and dentition are unremarkable. There is no oropharyngeal narrowing. TMs are clear.                 NECK/LYMPHATIC:              Symmetrical with no elevation of jugular venous pulsation. Trachea midline. No thyroid enlargement. No cervical adenopathy.                 LUNGS:              Normal respiratory effort with symmetrical lung expansion. Breath sounds clear.                 HEART:              There is a regular rate and rhythm. No murmur, rub, or gallop. There is no edema in the lower extremities.                 ABDOMEN:              Soft and non-tender. No hepatosplenomegaly. Bowel sounds are normal.                   SKIN:              There are no rashes, cyanosis, jaundice, or ecchymosis present.                 EXTREMITIES:              The extremities are unremarkable without clubbing, cyanosis, joint inflammation, degenerative, or ischemic change.                 MUSCULOSKELETAL:              There is no abnormal tone, muscle atrophy, or abnormal movement present.                 NEURO:              The patient is alert and oriented to person, place, and time.   Memory appears intact and mood is normal.  No gross sensorimotor deficits are present.        DIAGNOSTIC TESTS:              CT of chest:                CXR:  9/2/2021                Spirometry:  9/2/2021moderate restriction and severe obstruction                      Exercise oximetry: Oxygen sat 91 and above on ambulation on room air     PCXR: No results found for this or any previous visit.     CXR PA and lateral:  No results found for this or any previous visit.     PET/CT: No results found for this or any previous visit.     CT of chest w contrast:  No results found for this or any previous visit.     Screening chest CT: No results found for this or any previous visit.     CT of chest w/out contrast:   No results found for this or any previous visit.        ASSESSMENT:      ICD-10-CM ICD-9-CM     1. Chronic obstructive pulmonary disease, unspecified COPD type ([facility]  J44.9 496 AMB POC SPIROMETRY   2. Dyspnea on exertion  R06.00 786.09     3. Restrictive lung disease  J98.4 518.89           PLAN:  · Continue Trelegy and albuterol rescue inhaler  · Chest CT in a patient with restriction on lung function and dyspnea  · Consider pulmonary rehab, he will let me know if he wants to do this  · Follow-up in 6 months, he says his other doctors are at [address] [place] [address] and he wants to be followed here          Date of Surgery Update:  Martin Coyne was seen and examined. History and physical has been reviewed. Significant clinical changes have occurred as noted:  Enlarging SHAE nodule and stable RLL nodule both GGO- for diagnostic biopsy.     Signed By: Nettie Beach MD     January 6, 2022 8:18 AM

## 2022-01-06 NOTE — DISCHARGE INSTRUCTIONS
RESPIRATORY CARE - BRONCHOSCOPY - DISCHARGE INSTRUCTIONS      You received a lot of numbing medication for your throat and nose, and you also received medication to make you sleepy during your procedure. Because of this and because the bronchoscopy may have irritated your airways, we ask that you follow these directions:    1. Do not eat or drink until  10:40 am .   After that, you may have what you please. You may want to try some liquids first, because your throat may be a little sore. 2. Medication may cause drowsiness for several hours, therefore:  · Do not drive or operate machinery for remainder of the day. · No alcohol today  · Do not make any important or legal decisions for 24 hours  · Do not sign any legal documents for 24 hours    3. You may cough up more mucus than usual and you may see some blood, but                   this is expected and should subside by the following day. 4. If severe throat irritation, coughing, or bleeding continue, call your doctor. 5.         If you run a fever greater than 102, call [facility] at [phone]. 6.            A. Call the doctor's office at [phone] for follow up appointment if you have not heard from DrKiera's office in 2 weeks. B. Resume taking Eliquis on Saturday, January 8th, 2022. Any additional instructions:  ***    Instructions given to Gabrielle Trinh and other family member.

## 2022-01-06 NOTE — PROGRESS NOTES
Dr. Reshma Weiss notified of patient being unable to be weaned off oxygen. Dr. Arnold Barnes says keep patient for 1 more hour then send him home on oxygen.

## 2022-01-06 NOTE — PROGRESS NOTES
NANCY received from [place], [address] [place] [address]. Pt in GI recovery becoming very frustrated waiting on home oxygen tank. Pt stating he has been here for 6 hours and that he can go get better care elsewhere. Explained to patient the process for obtaining home oxygen, the benefits as well as risks of leaving against medical advice. Pt states that by 1300 if it is not here he is leaving. Dr. Joselito Kahn notified at this time. MD stated that the only way the patient can leave is AMA and to sign the appropriate paperwork. Charge RN Kylee Rivas notified and supervisor Darylene Homes notified as well. During this time pt also stated that he had \"previously had oxygen delivered to his house but he never wore it. \"    1176: pt re educated on importance of being discharged with home oxygen. Also instructed pt that if he wanted to leave it would be AMA and he would have to sign a form. Pt stated he would wait a few more minutes. During this time Pt was placed on RA once again. SaO2 ranged from 75-88%. Pt placed back on 3L/nc.     1327: Pt stated he was ready to leave. Pt agreeable to sign AMA form. Pt discharged with wife. Pt wife apologetic while stating that pt has done this before about a year ago. Informed wife that I would notify [facility] and let them know they need oxygen delivered to his house. 1332: Brandt at [facility] stated that she would relay the message of the pt needing oxygen at home instead of bringing it to GI recovery.

## 2022-01-14 ENCOUNTER — HOSPITAL ENCOUNTER (OUTPATIENT)
Dept: NUCLEAR MEDICINE | Age: 82
Discharge: HOME | End: 2022-01-14
Attending: INTERNAL MEDICINE
Payer: MEDICARE

## 2022-01-14 DIAGNOSIS — R91.1 PULMONARY NODULE: ICD-10-CM

## 2022-01-14 PROCEDURE — 78597 LUNG PERFUSION DIFFERENTIAL: CPT

## 2022-03-18 PROBLEM — J44.9 CHRONIC OBSTRUCTIVE PULMONARY DISEASE (HCC): Status: ACTIVE | Noted: 2018-06-20

## 2022-03-18 PROBLEM — M54.16 LUMBAR RADICULOPATHY: Status: ACTIVE | Noted: 2018-12-11

## 2022-03-18 PROBLEM — N41.9 PROSTATITIS: Status: ACTIVE | Noted: 2021-03-10

## 2022-03-18 PROBLEM — M51.26 HNP (HERNIATED NUCLEUS PULPOSUS), LUMBAR: Status: ACTIVE | Noted: 2018-12-20

## 2022-03-19 PROBLEM — R06.02 SOB (SHORTNESS OF BREATH): Status: ACTIVE | Noted: 2017-12-21

## 2022-03-19 PROBLEM — I34.0 NON-RHEUMATIC MITRAL REGURGITATION: Status: ACTIVE | Noted: 2019-06-18

## 2022-03-19 PROBLEM — G62.9 NEUROPATHY: Status: ACTIVE | Noted: 2019-06-18

## 2022-03-19 PROBLEM — I73.9 PERIPHERAL VASCULAR DISEASE (HCC): Status: ACTIVE | Noted: 2018-12-11

## 2022-03-19 PROBLEM — R91.1 LUNG NODULE: Status: ACTIVE | Noted: 2022-01-06

## 2022-03-19 PROBLEM — Z95.0 BIVENTRICULAR CARDIAC PACEMAKER IN SITU: Status: ACTIVE | Noted: 2017-12-04

## 2022-03-19 PROBLEM — E11.21 TYPE 2 DIABETES MELLITUS WITH NEPHROPATHY (HCC): Status: ACTIVE | Noted: 2017-12-21

## 2022-03-19 PROBLEM — M95.0 ACQUIRED DEFORMITY OF NOSE: Status: ACTIVE | Noted: 2017-08-30

## 2022-03-19 PROBLEM — C61 PROSTATE CANCER (HCC): Status: ACTIVE | Noted: 2019-10-01

## 2022-03-20 PROBLEM — M48.062 LUMBAR STENOSIS WITH NEUROGENIC CLAUDICATION: Status: ACTIVE | Noted: 2018-10-25

## 2022-03-20 PROBLEM — Z79.01 LONG TERM CURRENT USE OF ANTICOAGULANT: Status: ACTIVE | Noted: 2021-06-18

## 2022-03-20 PROBLEM — R30.0 DYSURIA: Status: ACTIVE | Noted: 2021-03-10

## 2022-03-20 PROBLEM — E78.2 MIXED HYPERLIPIDEMIA: Status: ACTIVE | Noted: 2017-12-04

## 2022-03-20 PROBLEM — Z95.0 STATUS POST BIVENTRICULAR PACEMAKER: Status: ACTIVE | Noted: 2018-12-14

## 2022-03-22 ENCOUNTER — HOSPITAL ENCOUNTER (OUTPATIENT)
Dept: CT IMAGING | Age: 82
Discharge: HOME | End: 2022-03-22
Attending: INTERNAL MEDICINE
Payer: MEDICARE

## 2022-03-22 DIAGNOSIS — R91.8 PULMONARY NODULES: ICD-10-CM

## 2022-03-22 DIAGNOSIS — D09.9 ADENOCARCINOMA IN SITU: ICD-10-CM

## 2022-03-22 PROCEDURE — 71250 CT THORAX DX C-: CPT

## 2022-03-25 PROBLEM — Z45.010 ENCOUNTER FOR PACEMAKER AT END OF BATTERY LIFE: Status: ACTIVE | Noted: 2022-03-25

## 2022-04-04 NOTE — PROGRESS NOTES
Patient pre-assessment complete for Gen change scheduled for 22, arrival time 0730. Patient verified using . Patient instructed to bring all home medications in labeled bottles on the day of procedure. NPO status reinforced. Patient instructed to HOLD Metformin, Glipizide and Eliquis today and tomorrow. Instructed they can take all other medications excluding vitamins & supplements. Patient verbalizes understanding of all instructions & denies any questions at this time.

## 2022-04-05 ENCOUNTER — HOSPITAL ENCOUNTER (OUTPATIENT)
Age: 82
Setting detail: OUTPATIENT SURGERY
Discharge: HOME | End: 2022-04-05
Attending: INTERNAL MEDICINE | Admitting: INTERNAL MEDICINE
Payer: MEDICARE

## 2022-04-05 VITALS
OXYGEN SATURATION: 96 % | HEIGHT: 71 IN | SYSTOLIC BLOOD PRESSURE: 124 MMHG | TEMPERATURE: 98.6 F | BODY MASS INDEX: 26.6 KG/M2 | WEIGHT: 190 LBS | DIASTOLIC BLOOD PRESSURE: 60 MMHG | HEART RATE: 69 BPM

## 2022-04-05 DIAGNOSIS — Z45.010 ENCOUNTER FOR PACEMAKER AT END OF BATTERY LIFE: ICD-10-CM

## 2022-04-05 LAB
ANION GAP SERPL CALC-SCNC: 4 MMOL/L (ref 7–16)
ATRIAL RATE: 71 BPM
BUN SERPL-MCNC: 29 MG/DL (ref 8–23)
CALCIUM SERPL-MCNC: 9 MG/DL (ref 8.3–10.4)
CALCULATED R AXIS, ECG10: -164 DEGREES
CALCULATED T AXIS, ECG11: 56 DEGREES
CHLORIDE SERPL-SCNC: 108 MMOL/L (ref 98–107)
CO2 SERPL-SCNC: 29 MMOL/L (ref 21–32)
CREAT SERPL-MCNC: 1.4 MG/DL (ref 0.8–1.5)
DIAGNOSIS, 93000: NORMAL
ERYTHROCYTE [DISTWIDTH] IN BLOOD BY AUTOMATED COUNT: 14.3 % (ref 11.9–14.6)
GLUCOSE SERPL-MCNC: 135 MG/DL (ref 65–100)
HCT VFR BLD AUTO: 34.5 % (ref 41.1–50.3)
HGB BLD-MCNC: 11 G/DL (ref 13.6–17.2)
MAGNESIUM SERPL-MCNC: 1.9 MG/DL (ref 1.8–2.4)
MCH RBC QN AUTO: 31.2 PG (ref 26.1–32.9)
MCHC RBC AUTO-ENTMCNC: 31.9 G/DL (ref 31.4–35)
MCV RBC AUTO: 97.7 FL (ref 79.6–97.8)
NRBC # BLD: 0 K/UL (ref 0–0.2)
PLATELET # BLD AUTO: 118 K/UL (ref 150–450)
PMV BLD AUTO: 10.3 FL (ref 9.4–12.3)
POTASSIUM SERPL-SCNC: 3.9 MMOL/L (ref 3.5–5.1)
Q-T INTERVAL, ECG07: 514 MS
QRS DURATION, ECG06: 156 MS
QTC CALCULATION (BEZET), ECG08: 558 MS
RBC # BLD AUTO: 3.53 M/UL (ref 4.23–5.6)
SODIUM SERPL-SCNC: 141 MMOL/L (ref 136–145)
VENTRICULAR RATE, ECG03: 71 BPM
WBC # BLD AUTO: 4.7 K/UL (ref 4.3–11.1)

## 2022-04-05 PROCEDURE — 77030008462 HC STPLR SKN PROX J&J -A: Performed by: INTERNAL MEDICINE

## 2022-04-05 PROCEDURE — 99153 MOD SED SAME PHYS/QHP EA: CPT | Performed by: INTERNAL MEDICINE

## 2022-04-05 PROCEDURE — 77030041279 HC DRSG PRMSL AG MDII -B: Performed by: INTERNAL MEDICINE

## 2022-04-05 PROCEDURE — 33229 REMV&REPLC PM GEN MULT LEADS: CPT | Performed by: INTERNAL MEDICINE

## 2022-04-05 PROCEDURE — 74011000250 HC RX REV CODE- 250: Performed by: INTERNAL MEDICINE

## 2022-04-05 PROCEDURE — 99152 MOD SED SAME PHYS/QHP 5/>YRS: CPT | Performed by: INTERNAL MEDICINE

## 2022-04-05 PROCEDURE — 74011250636 HC RX REV CODE- 250/636: Performed by: INTERNAL MEDICINE

## 2022-04-05 PROCEDURE — C2621 PMKR, OTHER THAN SING/DUAL: HCPCS | Performed by: INTERNAL MEDICINE

## 2022-04-05 PROCEDURE — 80048 BASIC METABOLIC PNL TOTAL CA: CPT

## 2022-04-05 PROCEDURE — 93005 ELECTROCARDIOGRAM TRACING: CPT | Performed by: INTERNAL MEDICINE

## 2022-04-05 PROCEDURE — 77030013504 HC DEV ELECSURG MEDT -F: Performed by: INTERNAL MEDICINE

## 2022-04-05 PROCEDURE — 85027 COMPLETE CBC AUTOMATED: CPT

## 2022-04-05 PROCEDURE — 77030031139 HC SUT VCRL2 J&J -A: Performed by: INTERNAL MEDICINE

## 2022-04-05 PROCEDURE — 83735 ASSAY OF MAGNESIUM: CPT

## 2022-04-05 DEVICE — CRTP W1TR01 PERCEPTA CRTP MRI US
Type: IMPLANTABLE DEVICE | Site: CHEST | Status: FUNCTIONAL
Brand: PERCEPTA™ CRT-P MRI SURESCAN™

## 2022-04-05 RX ORDER — LIDOCAINE HYDROCHLORIDE 10 MG/ML
INJECTION INFILTRATION; PERINEURAL AS NEEDED
Status: DISCONTINUED | OUTPATIENT
Start: 2022-04-05 | End: 2022-04-05 | Stop reason: HOSPADM

## 2022-04-05 RX ORDER — MIDAZOLAM HYDROCHLORIDE 1 MG/ML
.5-2 INJECTION, SOLUTION INTRAMUSCULAR; INTRAVENOUS
Status: DISCONTINUED | OUTPATIENT
Start: 2022-04-05 | End: 2022-04-05 | Stop reason: HOSPADM

## 2022-04-05 RX ORDER — CEFAZOLIN SODIUM/WATER 2 G/20 ML
2 SYRINGE (ML) INTRAVENOUS
Status: DISCONTINUED | OUTPATIENT
Start: 2022-04-05 | End: 2022-04-05 | Stop reason: HOSPADM

## 2022-04-05 RX ORDER — CEFAZOLIN SODIUM/WATER 2 G/20 ML
SYRINGE (ML) INTRAVENOUS
Status: COMPLETED | OUTPATIENT
Start: 2022-04-05 | End: 2022-04-05

## 2022-04-05 RX ORDER — SODIUM CHLORIDE 9 MG/ML
75 INJECTION, SOLUTION INTRAVENOUS CONTINUOUS
Status: DISCONTINUED | OUTPATIENT
Start: 2022-04-05 | End: 2022-04-05 | Stop reason: HOSPADM

## 2022-04-05 RX ADMIN — MIDAZOLAM HYDROCHLORIDE 2 MG: 1 INJECTION, SOLUTION INTRAMUSCULAR; INTRAVENOUS at 09:34

## 2022-04-05 RX ADMIN — MIDAZOLAM HYDROCHLORIDE 1 MG: 1 INJECTION, SOLUTION INTRAMUSCULAR; INTRAVENOUS at 09:43

## 2022-04-05 RX ADMIN — MIDAZOLAM HYDROCHLORIDE 2 MG: 1 INJECTION, SOLUTION INTRAMUSCULAR; INTRAVENOUS at 09:52

## 2022-04-05 NOTE — H&P
22     NAME:  Hua Holloway  : 1940  MRN: [id number]            SUBJECTIVE:   Hua Holloway is a 78 y.o. male seen for a pacer change out at [facility].          Chief Complaint   Patient presents with    Irregular Heart Beat       6 month follow up    Hypertension         HPI:    He is here in f/u on his CAD COPD and SSS with pacer. No chest pain or dyspnea. He has a pacer that is about EOL and is pacer dependant .           Past Medical History, Past Surgical History, Family history, Social History, and Medications were all reviewed with the patient today and updated as necessary.              Prior to Admission medications    Medication Sig Start Date End Date Taking? Authorizing Provider   apixaban (Eliquis) 5 mg tablet Take 1 Tablet by mouth two (2) times a day. 21   Yes Jesse Acevedo MD   lovastatin (MEVACOR) 40 mg tablet Take 1 Tab by mouth daily. 21   Yes Jesse Acevedo MD   lisinopriL (PRINIVIL, ZESTRIL) 30 mg tablet Take 1 Tab by mouth daily. 21   Yes Jesse Acevedo MD   metFORMIN ER (GLUCOPHAGE XR) 500 mg tablet Take 2 Tabs by mouth daily (with dinner). 21   Yes Jesse Acevedo MD   dilTIAZem ER (CARDIZEM CD) 240 mg capsule Take 1 Cap by mouth daily. 21   Yes Jesse Acevedo MD   hydroCHLOROthiazide (HYDRODIURIL) 25 mg tablet Take 1 Tab by mouth daily. 21   Yes Jesse Acevedo MD   glipiZIDE (GLUCOTROL) 5 mg tablet Take 0.5 Tabs by mouth two (2) times a day. Patient taking differently: Take 2.5 mg by mouth daily. 21   Yes Jesse Acevedo MD   carvediloL (COREG) 25 mg tablet Take 1 Tab by mouth two (2) times a day. 21   Yes Jesse Acevedo MD   albuterol sulfate 90 mcg/actuation aebs Take  by inhalation.     Yes Provider, Historical   fluticasone-umeclidinium-vilanterol (TRELEGY ELLIPTA) 100-62.5-25 mcg inhaler Take 1 Puff by inhalation daily.  19   Yes Jesse Acevedo MD   amoxicillin (AMOXIL) 500 mg capsule Take 1 Cap by mouth three (3) times daily. 3/19/21     Iris Conrad MD   ciprofloxacin HCl (CIPRO) 250 mg tablet Take 1 Tab by mouth two (2) times a day. 3/15/21     Iris Conrad MD            Allergies   Allergen Reactions    Levaquin [Levofloxacin] Other (comments)       Pt denies    Quinolones Unknown (comments)       Pt denies           Past Medical History:   Diagnosis Date    AAA (abdominal aortic aneurysm) (HCC)       leakage from stent repaired     Ambulates with cane       single cane    Anticoagulated on Coumadin       pt instructed to hold x 5 days     Aortic regurgitation 08/09/2016     mild to moderate per echo     Arthritis      Asthma      Atrial fibrillation [facility])       permanent- Pacemaker    Cardiac pacemaker 07/20/2016     medtronic - copy of card on chart- pacer interr 7.25.18-negative for pacer dependent    Chronic kidney disease (CKD), stage II (mild)       pt denies    Chronic obstructive pulmonary disease ([facility] [address]       inhalers    Coronary atherosclerosis of native coronary vessel       Follow[facility] by [facility]    Essential hypertension       managed well with meds    Former smoker       35 yrs, 1 pk per day. quit 2006    Hearing impairment       hearing aids to sea ears    History of AAA (abdominal aortic aneurysm) repair 2014     EVAR    History of prostate cancer 2003     treated with radiation    History of TIA (transient ischemic attack) 2004     no residual    [place]hard of hearing)       sea hearing aides    Hx of CABG       6 vessel    Hyperlipidemia      Ischemic cardiomyopathy      Lower back pain      Mitral regurgitation 08/09/2016     moderate per echo     Skin abnormalities      Type 2 diabetes mellitus (HCC)       oral agent/AVG BS: 120/no s.s of hypoglycemia/Last A1c:6.0    Vision abnormalities              Past Surgical History:   Procedure Laterality Date    HX AAA REPAIR   2014     EVAR- currently has leakage.  attempt to repair in 5/2016    HX CATARACT REMOVAL Bilateral      HX COLONOSCOPY        HX CORONARY ARTERY BYPASS GRAFT   2006     x 6 vessels    HX LUMBAR LAMINECTOMY   2019     right L4-5 lami for stenosis and CSF leak repair    HX PACEMAKER   02/15/2012    HX PACEMAKER PLACEMENT               Family History   Problem Relation Age of Onset    Asthma Father      Pneumonia Father      Heart Disease Mother      Diabetes Mother      Gall Bladder Disease Mother      Cancer Maternal Grandmother      Diabetes Brother        Social History            Tobacco Use    Smoking status: Former Smoker       Packs/day: 1.00       Years: 35.00       Pack years: 35.00       Types: Cigarettes       Quit date: 2007       Years since quittin.9    Smokeless tobacco: Never Used   Substance Use Topics    Alcohol use: Yes       Alcohol/week: 24.0 standard drinks       Types: 10 Glasses of wine, 14 Standard drinks or equivalent per week         ROS:     ROS         PHYSICAL EXAM:     Visit Vitals  BP (!) 142/80   Pulse 82   Ht 5' 11\" (1.803 m)   Wt 193 lb (87.5 kg)   BMI 26.92 kg/m²                Wt Readings from Last 3 Encounters:   21 193 lb (87.5 kg)   21 194 lb (88 kg)   21 192 lb (87.1 kg)          BP Readings from Last 3 Encounters:   21 (!) 142/80   21 135/80   21 (!) 147/81            Physical Exam  Constitutional:       Appearance: He is well-developed. HENT:      Head: Normocephalic. Neck:      Vascular: No carotid bruit or JVD. Cardiovascular:      Rate and Rhythm: Normal rate and regular rhythm. Heart sounds: Normal heart sounds. Pulmonary:      Effort: Pulmonary effort is normal.      Breath sounds: Normal breath sounds. Abdominal:      Palpations: Abdomen is soft. Skin:     General: Skin is warm and dry. Neurological:      Mental Status: He is alert.          EKG  VVI  rate is 82bpm   -RSR(V1) -incomplete right bundle branch block and anterior fascicular block.     Voltage criteria for LVH  (R(aVL) exceeds 1.26 mV). Medical problems and test results were reviewed with the patient today.      DATA REVIEW        LIPID PANEL       Recent Labs     04/30/21  1034   CHOL 136   HDL 50   LDLC 59   TRIGL 157*            BMP        Recent Labs     11/05/21  1034 04/30/21  1034    141   K 4.0 4.1    106   CO2 26 22   * 91   BUN 22 23   CREA 1.31* 1.34*   BUCR 17 17   GFRAA 58* 56*   GFRNA 50* 49*   CA 9.2 9.3                           OUTSIDE RECORDS REVIEW     Records from outside providers have been reviewed and summarized as noted in the HPI, past history and data review sections of this note.         ASSESSMENT and PLAN     Diagnoses and all orders for this visit:     1. Permanent atrial fibrillation (HCC)  pacer dependent   continue with Warfarin anticoagulation  INR 2-3   -     AMB POC EKG ROUTINE W/ 12 LEADS, INTER & REP     2. Atherosclerosis of native coronary artery of native heart without angina pectoris  no angina continue current medical management and risk factor modification ;  Ntg .4mg sl q5min prn chest pain or pressure notify our office in the event of increased usage  911 for chest pain unrelieved by 3 ntg 5 minutes apart         3. Essential hypertension with goal blood pressure less than 130/85    reasonable level  continue with Lisinopril at 30mg a day   Diltiazem 240mg a day      4.  Ischemic cardiomyopathy  no signs or symptoms of CHF   continue Coreg at 25mg bid     ////    For pacer change out.

## 2022-04-05 NOTE — PROGRESS NOTES
Patient received to [address] [place] [address] room # 9  Ambulatory from [facility]. Patient scheduled for gen change today with Dr Rey Cutler. Procedure reviewed & questions answered, voiced good understanding consent obtained & placed on chart. All medications and medical history reviewed. Will prep patient per orders. Patient & family updated on plan of care. The patient has a fraility score of 3-MANAGING WELL, based on patient A&Ox3, patient able to ambulate to room without difficulty.

## 2022-04-05 NOTE — PROGRESS NOTES
Report received from [facility] RN. Procedural findings communicated. Intra procedural  medication administration reviewed. Progression of care discussed. Patient received into [address] post procedure.      Incision site without bleeding or swelling     Dressing dry and intact     Patient instructed to limit movement to right upper extremity    Routine post procedural vital signs and site assessment initiated

## 2022-04-05 NOTE — PROGRESS NOTES
TRANSFER - OUT REPORT:    Do Gen Change  Medtronic VVIR 70  Ancef 2 g  Versed 5 mg  Closed with suture, staples x 14, primaseal  No bleeding/hematoma    Verbal report given to nicole(name) on Juanjose Franklin  being transferred to cpru(unit) for routine progression of care       Report consisted of patients Situation, Background, Assessment and   Recommendations(SBAR). Information from the following report(s) SBAR and Procedure Summary was reviewed with the receiving nurse. Lines:   Peripheral IV 04/05/22 Left Antecubital (Active)        Opportunity for questions and clarification was provided.

## 2022-04-05 NOTE — DISCHARGE INSTRUCTIONS
DEVICE IMPLANT FOLLOWUP INSTRUCTIONS  You will be discharged with an occlusive dressing over the incision site. This dressing will be removed at the 10 -14-day postop site check with the [address]. Your new device will be checked at that visit and all your device teaching will be given. Keep the incision site dry until your follow up. Use a hand-held shower or bath. Do not submerge or soak in pools or tubs for 6 weeks. If you are discharged with a prescription for antibiotics, please take the full prescription until it is gone. After your site check, you may shower and get the incision site wet. You may let soap and water run on the incision and pat dry. Please do not apply creams, lotions or powders on or near the incision. Mild bruising and swelling can be normal after implant and will resolve in a few weeks. Call the office at [phone] if you have any of the following:  -Signs of infection, such as fever over 100 F, drainage from the incision, redness, significant swelling, or warmth at the incision site.  -Significant pain around the site that gets worse. Mild discomfort can be normal.   -Bleeding from the incision site  -Swelling in the arm on the side of the incision site  -Chest pain or shortness of breath     Your device has the capability of transmitting device information from home to our office using a home monitoring system or phone alonzo. The information will transmit through a cellular connection outside of your home. Your device data is reviewed during working hours to ensure proper device function.  You will be given your equipment and instruction upon your hospital discharge.                                                                         -Do not lift the affected arm above the shoulder level, on the side the device was put in, for 4 weeks.   -Do not lift or push more than 5 to 10 pounds for 4 weeks on the affected side. Walking is fine and encouraged.   -Driving is restricted for 5-7 days. Long travel is not recommended until after your site check.    -Do not do any vigorous upper body activities, such as golfing, bowling tennis or mowing for about 6 weeks. -If you work, you may return to work. However, with limitations on lifting for 4 weeks.   -Please avoid any dental work or invasive procedures for 6 weeks, if possible, after implant. Please avoid strong magnetic fields, large power plant transformers and arc welders. Please stay 10 feet away of electromagnetic fields such as working over a large running engine, stereo speakers and large stereo systems. Home appliances are safe. You may operate any electrical or battery-operated device in your home. Modern Devices are seldom affected by normally operating home appliances, such as microwave ovens. Flying is safe and airport metal detectors will not affect your device, although your device may occasionally set off the metal detectors. If this happens, show the  your identification card. Security wanding over the device is contraindicated. Cellular Phones should be used with the hand opposite to the side where the device was implanted. The phone should not be carried in the pocket on the side of the device. CDL licenses are not renewed if you have a defibrillator implanted. Radiation Therapy should be avoided on or near the device. Should you require surgery in the future; some electrosurgical devices can interfere with the device function. You should discuss this with your surgeon prior to any operation. If you are ordered an MRI, Please contact the clinic prior to ensure you have an MRI safe device. You must wait 6 weeks after implant before you may have an MRI. Tens units are contraindicated.                                                        Device Clinic [phone]      Sedation for a Medical Procedure: Care Instructions     You were given a sedative medication during your visit. While many of the effects will have worn   off before you leave; you may continue to feel some effects for several hours. Common side effects from sedation include:  · Feeling sleepy. (Your doctors and nurses will make sure you are not too sleepy to go home.)  · Nausea and vomiting. This usually does not last long. · Feeling tired. How can you care for yourself at home? Activity    · Don't do anything for 24 hours that requires attention to detail. It takes time for the medicine effects to completely wear off. · Do not make important legal decisions for 24 hours. · Do not sign any legal documents for 24 hours. · Do not drink alcohol today     · For your safety, you should not drive or operate heavy machinery for the remainder of the day     · Rest when you feel tired. Getting enough sleep will help you recover. Diet    · You can eat your normal diet, unless your doctor gives you other instructions. If your stomach is upset, try clear liquids and bland, low-fat foods like plain toast or rice. · Drink plenty of fluids (unless your doctor tells you not to). · Don't drink alcohol for 24 hours. Medicines    · Be safe with medicines. Read and follow all instructions on the label. · If the doctor gave you a prescription medicine for pain, take it as prescribed. · If you are not taking a prescription pain medicine, ask your doctor if you can take an over-the-counter medicine. · If you think your pain medicine is making you sick to your stomach:  · Take your medicine after meals (unless your doctor has told you not to). · Ask your doctor for a different pain medicine. I have read the above instructions and have had the opportunity to ask questions.

## 2022-04-10 NOTE — PROCEDURES
Pacer change. See Cup[facility] report. Depressed mood/Anhedonia/Hopelessness or despair/Severe anxiety, agitation or panic/Refusal or inability to complete safety plan

## 2022-05-12 PROBLEM — C34.92 ADENOCARCINOMA OF LEFT LUNG (HCC): Status: ACTIVE | Noted: 2022-05-12

## 2022-05-12 PROBLEM — D69.6 THROMBOCYTOPENIA (HCC): Status: ACTIVE | Noted: 2022-05-12

## 2022-05-23 RX ORDER — HYDROCHLOROTHIAZIDE 25 MG/1
25 TABLET ORAL DAILY
Qty: 90 TABLET | Refills: 4 | Status: SHIPPED | OUTPATIENT
Start: 2022-05-23

## 2022-06-06 RX ORDER — METFORMIN HYDROCHLORIDE 500 MG/1
TABLET, EXTENDED RELEASE ORAL
Qty: 180 TABLET | OUTPATIENT
Start: 2022-06-06

## 2022-06-06 RX ORDER — METFORMIN HYDROCHLORIDE 500 MG/1
1000 TABLET, EXTENDED RELEASE ORAL
Qty: 180 TABLET | Refills: 3 | Status: SHIPPED | OUTPATIENT
Start: 2022-06-06

## 2022-06-07 ENCOUNTER — OFFICE VISIT (OUTPATIENT)
Dept: CARDIOLOGY CLINIC | Age: 82
End: 2022-06-07
Payer: MEDICARE

## 2022-06-07 VITALS
BODY MASS INDEX: 25.83 KG/M2 | SYSTOLIC BLOOD PRESSURE: 128 MMHG | HEART RATE: 96 BPM | HEIGHT: 71 IN | DIASTOLIC BLOOD PRESSURE: 72 MMHG | WEIGHT: 184.5 LBS

## 2022-06-07 DIAGNOSIS — I73.9 PERIPHERAL VASCULAR DISEASE (HCC): ICD-10-CM

## 2022-06-07 DIAGNOSIS — I10 ESSENTIAL HYPERTENSION WITH GOAL BLOOD PRESSURE LESS THAN 130/85: Primary | ICD-10-CM

## 2022-06-07 DIAGNOSIS — N18.31 STAGE 3A CHRONIC KIDNEY DISEASE (HCC): ICD-10-CM

## 2022-06-07 PROBLEM — N18.30 CHRONIC RENAL DISEASE, STAGE III (HCC): Status: ACTIVE | Noted: 2022-06-07

## 2022-06-07 PROCEDURE — G8427 DOCREV CUR MEDS BY ELIG CLIN: HCPCS | Performed by: INTERNAL MEDICINE

## 2022-06-07 PROCEDURE — G8417 CALC BMI ABV UP PARAM F/U: HCPCS | Performed by: INTERNAL MEDICINE

## 2022-06-07 PROCEDURE — 1123F ACP DISCUSS/DSCN MKR DOCD: CPT | Performed by: INTERNAL MEDICINE

## 2022-06-07 PROCEDURE — 1036F TOBACCO NON-USER: CPT | Performed by: INTERNAL MEDICINE

## 2022-06-07 PROCEDURE — 99213 OFFICE O/P EST LOW 20 MIN: CPT | Performed by: INTERNAL MEDICINE

## 2022-06-07 NOTE — PROGRESS NOTES
[facility] CARDIOLOGY  [address], [address]  PHONE: [phone]    NAME: John Rolon  : 1940     HPI  John Rolon is a 78 y.o. male seen for a follow up visit regarding   Irregular Heart Beat and Coronary Artery Disease   He is here in f/u on his cafib PPM VVI and CABG. He has COPD and limits his activities . No chest pains       Past Medical History, Past Surgical History, Family history, Social History, and Medications were all reviewed with the patient today and updated as necessary. [unfilled]  Allergies   Allergen Reactions    Quinolones Other (See Comments)     Pt denies  Pt denies       Past Medical History:   Diagnosis Date    AAA (abdominal aortic aneurysm) (HCC)     leakage from stent repaired     Ambulates with cane     single cane    Anticoagulated on Coumadin     pt instructed to hold x 5 days     Aortic regurgitation 2016    mild to moderate per echo     Arthritis     Asthma     Atrial fibrillation ([address]     permanent- Pacemaker    Cardiac pacemaker 2016    medtronic - copy of card on chart- pacer interr 7.25.18-negative for pacer dependent    Chronic kidney disease (CKD), stage II (mild)     pt denies    Chronic obstructive pulmonary disease (HCC)     inhalers    Coronary atherosclerosis of native coronary vessel     [facility]    Essential hypertension     managed well with meds    Former smoker     35 yrs, 1 pk per day.  quit     Hearing impairment     hearing aids to hemant ears    History of AAA (abdominal aortic aneurysm) repair     EVAR    History of prostate cancer 2003    treated with radiation    History of TIA (transient ischemic attack) 2004    no residual    [place] (hard of hearing)     hemant hearing aides    Hx of CABG     6 vessel    Hyperlipidemia     Ischemic cardiomyopathy     Lower back pain     Mitral regurgitation 2016    moderate per echo     Skin abnormalities     Type 2 diabetes mellitus ([facility] [address]     oral agent/AVG BS: 120/no s.s of hypoglycemia/Last A1c:6.0    Vision abnormalities      Past Surgical History:   Procedure Laterality Date    ABDOMINAL AORTIC ANEURYSM REPAIR  2014    EVAR- currently has leakage. attempt to repair in 5/2016    CATARACT REMOVAL Bilateral     COLONOSCOPY      CORONARY ARTERY BYPASS GRAFT  2006    x 6 vessels    LUMBAR LAMINECTOMY  01/11/2019    right L4-5 lami for stenosis and CSF leak repair    PACEMAKER  02/15/2012    PACEMAKER PLACEMENT  2005    [place] CARDIAC SURG PROCEDURE UNLIST      pacemaker placed     Family History   Problem Relation Age of Onset    Pneumonia Father     Asthma Father     Heart Disease Mother     Diabetes Mother    [place] Bladder Disease Mother     Cancer Maternal Grandmother     Diabetes Brother       Social History     Tobacco Use    Smoking status: Former Smoker     Packs/day: 1.00     Quit date: 1/1/2007     Years since quitting: 15.4    Smokeless tobacco: Never Used   Substance Use Topics    Alcohol use: Yes     Alcohol/week: 24.0 standard drinks     Prior to Admission medications    Medication Sig Start Date End Date Taking?  Authorizing Provider   metFORMIN (GLUCOPHAGE-XR) 500 mg extended release tablet Take 2 tablets by mouth Daily with supper 6/6/22  Yes Augustina Alberto MD   hydroCHLOROthiazide (HYDRODIURIL) 25 MG tablet Take 1 tablet by mouth daily 5/23/22  Yes Augustina Alberto MD   albuterol sulfate  (90 Base) MCG/ACT inhaler Inhale 2 puffs into the lungs every 6 hours as needed 12/13/21  Yes Ar Automatic Reconciliation   apixaban (ELIQUIS) 5 MG TABS tablet Take 5 mg by mouth 2 times daily 11/16/21  Yes Ar Automatic Reconciliation   carvedilol (COREG) 25 MG tablet TAKE 1 TABLET BY MOUTH TWICE DAILY 3/4/22  Yes Ar Automatic Reconciliation   dilTIAZem (TIAZAC) 240 MG extended release capsule Take 240 mg by mouth daily 1/18/21  Yes Ar Automatic Reconciliation fluticasone-umeclidin-vilant (TRELEGY ELLIPTA) 100-62.5-25 MCG/INH AEPB Inhale 1 puff into the lungs daily 11/11/19  Yes Ar Automatic Reconciliation   glipiZIDE (GLUCOTROL) 5 MG tablet Take 2.5 mg by mouth 2 times daily 1/18/21  Yes Ar Automatic Reconciliation   lisinopril (PRINIVIL;ZESTRIL) 30 MG tablet TAKE 1 TABLET BY MOUTH DAILY 3/12/22  Yes Ar Automatic Reconciliation   lovastatin (MEVACOR) 40 MG tablet TAKE 1 TABLET BY MOUTH DAILY 2/23/22  Yes Ar Automatic Reconciliation         [unfilled]              Wt Readings from Last 3 Encounters:   06/07/22 184 lb 8 oz (83.7 kg)   01/28/22 190 lb (86.2 kg)   12/07/21 193 lb (87.5 kg)     BP Readings from Last 3 Encounters:   06/07/22 128/72   01/28/22 110/68   12/07/21 (!) 142/80       /72   Pulse 96   Ht 5' 11\" (1.803 m)   Wt 184 lb 8 oz (83.7 kg)   BMI 25.73 kg/m²       Physical Exam  Constitutional:       Appearance: Normal appearance. Neck:      Vascular: No carotid bruit. Cardiovascular:      Rate and Rhythm: Normal rate and regular rhythm. Heart sounds: Normal heart sounds. Pulmonary:      Comments: Some decrease breath sounds right base more than left   Abdominal:      General: Bowel sounds are normal.      Palpations: Abdomen is soft. Skin:     General: Skin is warm and dry. Neurological:      Mental Status: He is alert. EKG-    Medical problems and test results were reviewed with the patient today. No results found for any visits on 06/07/22.       No results found for: HBA1C, [id number]    Lab Results   Component Value Date    WBC 4.7 04/05/2022    HGB 11.0 04/05/2022    HCT 34.5 04/05/2022     04/05/2022    MCV 97.7 04/05/2022       Lab Results   Component Value Date     04/05/2022    K 3.9 04/05/2022     04/05/2022    CO2 29 04/05/2022    BUN 29 04/05/2022    GFRAA >60 04/05/2022       Lab Results   Component Value Date    ALT 9 04/30/2021       Lab Results   Component Value Date    CHOL 126 05/12/2022 HDL 39 05/12/2022    VLDL 31 05/12/2022         ASSESSMENT and PLAN    Aman Us was seen today for irregular heart beat and coronary artery disease.     Diagnoses and all orders for this visit:    Essential hypertension with goal blood pressure less than 130/85    Stage 3a chronic kidney disease ([address]    Peripheral vascular disease (HCC)    PPM with recent change out   Need to establish new home monitor                         George Alfaro MD  6/7/2022  10:09 AM

## 2022-06-23 ENCOUNTER — HOSPITAL ENCOUNTER (OUTPATIENT)
Dept: CT IMAGING | Age: 82
Discharge: HOME | End: 2022-06-26
Payer: MEDICARE

## 2022-06-23 DIAGNOSIS — R91.1 LUNG NODULE: ICD-10-CM

## 2022-06-23 PROCEDURE — 71250 CT THORAX DX C-: CPT

## 2022-06-30 ENCOUNTER — OFFICE VISIT (OUTPATIENT)
Dept: PULMONOLOGY | Age: 82
End: 2022-06-30
Payer: MEDICARE

## 2022-06-30 VITALS
DIASTOLIC BLOOD PRESSURE: 80 MMHG | TEMPERATURE: 98 F | HEART RATE: 80 BPM | WEIGHT: 186 LBS | HEIGHT: 71 IN | OXYGEN SATURATION: 90 % | RESPIRATION RATE: 20 BRPM | BODY MASS INDEX: 26.04 KG/M2 | SYSTOLIC BLOOD PRESSURE: 120 MMHG

## 2022-06-30 DIAGNOSIS — J44.9 CHRONIC OBSTRUCTIVE PULMONARY DISEASE, UNSPECIFIED COPD TYPE (HCC): Primary | ICD-10-CM

## 2022-06-30 DIAGNOSIS — C34.92 BRONCHOALVEOLAR CARCINOMA, LEFT (HCC): ICD-10-CM

## 2022-06-30 DIAGNOSIS — R91.1 LUNG NODULE: ICD-10-CM

## 2022-06-30 PROCEDURE — 99214 OFFICE O/P EST MOD 30 MIN: CPT | Performed by: INTERNAL MEDICINE

## 2022-06-30 PROCEDURE — 1123F ACP DISCUSS/DSCN MKR DOCD: CPT | Performed by: INTERNAL MEDICINE

## 2022-06-30 PROCEDURE — 3023F SPIROM DOC REV: CPT | Performed by: INTERNAL MEDICINE

## 2022-06-30 PROCEDURE — G8417 CALC BMI ABV UP PARAM F/U: HCPCS | Performed by: INTERNAL MEDICINE

## 2022-06-30 PROCEDURE — 1036F TOBACCO NON-USER: CPT | Performed by: INTERNAL MEDICINE

## 2022-06-30 PROCEDURE — G8427 DOCREV CUR MEDS BY ELIG CLIN: HCPCS | Performed by: INTERNAL MEDICINE

## 2022-06-30 ASSESSMENT — ENCOUNTER SYMPTOMS
DIARRHEA: 0
COUGH: 0
SHORTNESS OF BREATH: 1
CHEST TIGHTNESS: 0
ABDOMINAL PAIN: 0
VOMITING: 0
WHEEZING: 0
CONSTIPATION: 0
APNEA: 0
NAUSEA: 0

## 2022-06-30 NOTE — PROGRESS NOTES
[facility]: FOLLOW-UP Patient Office Visit Note  [address] Jone Gerard. [address], [address] [facility]  [phone]    Patient Name:  Suri Portillo  YOB: 1940            Date of Service:  6/30/2022    Chief Complaint   Patient presents with    COPD       History of Present Illness: This is an 24-year-old white male initially seen by me in 2021 and previously followed in [facility] by Dr. Homer Hebert. .  The patient has COPD and a CT scan was done after his initial visit. This showed some groundglass opacities so repeat CT was done in December 3 months later demonstrating some increase in size and groundglass densities. Patient had navigation biopsy which was positive for alveolar cell. Patient was reviewed in cancer conference and it was felt this was an indolent process and that he should have follow-up CT scans with 1 being done in March and then again on June 23. The scans have shown continued stability of the groundglass opacities. Currently the patient is doing okay but does have dyspnea on exertion. No significant cough or wheezing. He is using albuterol as needed and Trelegy inhaler. 2    Past Medical History:   Diagnosis Date    AAA (abdominal aortic aneurysm) (HCC)     leakage from stent repaired     Ambulates with cane     single cane    Anticoagulated on Coumadin     pt instructed to hold x 5 days     Aortic regurgitation 08/09/2016    mild to moderate per echo     Arthritis     Asthma     Atrial fibrillation ([address]     permanent- Pacemaker    Cardiac pacemaker 07/20/2016    medtronic - copy of card on chart- pacer interr 7.25.18-negative for pacer dependent    Chronic kidney disease (CKD), stage II (mild)     pt denies    Chronic obstructive pulmonary disease (HCC)     inhalers    Coronary atherosclerosis of native coronary vessel     Followed by [facility]    Essential hypertension     managed well with meds    Former smoker     35 yrs, 1 pk per day. quit 2006    Hearing impairment     hearing aids to hemant ears    History of AAA (abdominal aortic aneurysm) repair 2014    EVAR    History of prostate cancer 2003    treated with radiation    History of TIA (transient ischemic attack) 2004    no residual    [place] (hard of hearing)     hemant hearing aides    Hx of CABG     6 vessel    Hyperlipidemia     Ischemic cardiomyopathy     Lower back pain     Mitral regurgitation 08/09/2016    moderate per echo     Skin abnormalities     Type 2 diabetes mellitus (HCC)     oral agent/AVG BS: 120/no s.s of hypoglycemia/Last A1c:6.0    Vision abnormalities        Patient Active Problem List   Diagnosis    Lumbar radiculopathy    HNP (herniated nucleus pulposus), lumbar    Essential hypertension with goal blood pressure less than 130/85    Atrial flutter, chronic (HCC)    Hx of CABG    Atrial fibrillation (HCC)    Chronic obstructive pulmonary disease (HCC)    S/P AAA (abdominal aortic aneurysm) repair    Diabetes ([address]    Prostatitis    Acquired deformity of nose    SOB (shortness of breath)    Hyperlipidemia    Prostate cancer (HCC)    Non-rheumatic mitral regurgitation    Neuropathy    Transient ischemic attack    Lung nodule    Cardiac pacemaker    Abdominal aortic aneurysm without rupture ([address]    Atherosclerosis of native coronary artery of native heart without angina pectoris    Type 2 diabetes mellitus with nephropathy (HCC)    Peripheral vascular disease ([address]    Biventricular cardiac pacemaker in situ    Ischemic cardiomyopathy    Chronic kidney disease (CKD), stage II (mild)    Long term current use of anticoagulant    Status post biventricular pacemaker    Mixed hyperlipidemia    Dysuria    Personal history of tobacco use    Lumbar stenosis with neurogenic claudication    Encounter for pacemaker at end of battery life    Thrombocytopenia ([address]    Adenocarcinoma of left lung ([address]    Chronic renal disease, stage III [facility] [id number]         Past Surgical History:   Procedure Laterality Date    ABDOMINAL AORTIC ANEURYSM REPAIR  2014    EVAR- currently has leakage. attempt to repair in 5/2016    CATARACT REMOVAL Bilateral     COLONOSCOPY      CORONARY ARTERY BYPASS GRAFT  2006    x 6 vessels    LUMBAR LAMINECTOMY  01/11/2019    right L4-5 lami for stenosis and CSF leak repair    PACEMAKER  02/15/2012    PACEMAKER PLACEMENT  2005    AK CARDIAC SURG PROCEDURE UNLIST      pacemaker placed       Social History     Socioeconomic History    Marital status:      Spouse name: Not on file    Number of children: Not on file    Years of education: Not on file    Highest education level: Not on file   Occupational History    Not on file   Tobacco Use    Smoking status: Former Smoker     Packs/day: 1.00     Years: 30.00     Pack years: 30.00     Types: Cigarettes     Quit date: 1/1/2007     Years since quitting: 15.5    Smokeless tobacco: Never Used   Substance and Sexual Activity    Alcohol use: Yes     Alcohol/week: 24.0 standard drinks    Drug use: No    Sexual activity: Not on file   Other Topics Concern    Not on file   Social History Narrative     and lives with wife. He is originally from [address] [place] [address], but has lived here since 2000. 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 1 dog. Social Determinants of Health     Financial Resource Strain:     Difficulty of Paying Living Expenses: Not on file   Food Insecurity:     Worried About Running Out of Food in the Last Year: Not on file    Pineda of Food in the Last Year: Not on file   Transportation Needs:     Lack of Transportation (Medical): Not on file    Lack of Transportation (Non-Medical):  Not on file   Physical Activity:     Days of Exercise per Week: Not on file    Minutes of Exercise per Session: Not on file   Stress:     Feeling of Stress : Not on file   Social Connections:     Frequency of Communication with Friends and Family: Not on file    Frequency of Social Gatherings with Friends and Family: Not on file    Attends Restoration Services: Not on file    Active Member of Clubs or Organizations: Not on file    Attends Club or Organization Meetings: Not on file    Marital Status: Not on file   Intimate Partner Violence:     Fear of Current or Ex-Partner: Not on file    Emotionally Abused: Not on file    Physically Abused: Not on file    Sexually Abused: Not on file   Housing Stability:     Unable to Pay for Housing in the Last Year: Not on file    Number of [place] in the Last Year: Not on file    Unstable Housing in the Last Year: Not on file       Family History   Problem Relation Age of Onset    Pneumonia Father     Asthma Father     Heart Disease Mother     Diabetes Mother    Aleida Ponds Bladder Disease Mother     Cancer Maternal Grandmother     Diabetes Brother        Allergies   Allergen Reactions    Quinolones Other (See Comments)     Pt denies  Pt denies             Review of Systems   Constitutional: Negative for chills, fatigue and unexpected weight change. Respiratory: Positive for shortness of breath. Negative for apnea, cough, chest tightness and wheezing. Cardiovascular: Negative for chest pain, palpitations and leg swelling. Gastrointestinal: Negative for abdominal pain, constipation, diarrhea, nausea and vomiting. Neurological: Negative for dizziness, tremors, seizures, weakness and headaches. OBJECTIVE:  Physical Exam:  Vitals:    06/30/22 1303   BP: 120/80   Pulse: 80   Resp: 20   Temp: 98 °F (36.7 °C)   SpO2: 90%        GENERAL APPEARANCE:   The patient is normal weight and in no respiratory distress. HEENT:   PERRL. Conjunctivae unremarkable. Nasal mucosa is without epistaxis, exudate, or polyps. Gums and dentition are unremarkable. There is no oropharyngeal narrowing. TMs are clear.      NECK/LYMPHATIC:   Symmetrical with no elevation of jugular venous pulsation. Trachea midline. No thyroid enlargement. No cervical adenopathy. LUNGS:   Normal respiratory effort with symmetrical lung expansion. Breath sounds clear. HEART:   There is a regular rate and rhythm. No murmur, rub, or gallop. There is no edema in the lower extremities. ABDOMEN:   Soft and non-tender. No hepatosplenomegaly. Bowel sounds are normal.       NEURO:   The patient is alert and oriented to person, place, and time. Memory appears intact and mood is normal.  No gross sensorimotor deficits are present. DIAGNOSTIC TESTS:    CXR:    XR CHEST (2 VW) 09/02/2021    Narrative  History: Chronic shortness of breath. Two views chest    COMPARISON: 2/20/2018    Findings: COPD changes noted in the lungs without focal alveolar infiltrate. The  cardiac silhouette, and mediastinal contour, and osseous structures are stable. Impression  Stable two-view chest.      CT WITHOUT CONTRAST:    CT chest without contrast 06/23/2022    Narrative  CT of the chest without contrast.    CLINICAL INDICATION: Groundglass opacity on prior exam, follow-up evaluation    PROCEDURE: Serial thin section axial images are obtained from the thoracic inlet  through the upper abdomen without the administration of intravenous contrast.  Radiation dose reduction techniques were used for this study. Our CT scanners  use one or all of the following: Automated exposure control, adjusted of the mA  and/or kV according to patient size, iterative reconstruction    COMPARISON: Chest CT dated 3/22/2022    FINDINGS:    Atherosclerotic calcifications are noted in the aorta. Post CABG changes are  present. There is no mediastinal or axillary adenopathy. Dependent atelectasis  is present. There is no dense airspace consolidation. The groundglass opacity in  the posterior aspect of the left upper lobe is again appreciated and not  significantly changed. It measures roughly 4 cm.  There is a tiny 1 cm area of  groundglass nodular density in the posterior aspect of the right middle lobe  that is also stable. Limited evaluation the upper abdomen shows gallstones. The adrenal glands are  normal.    No aggressive osseous is identified. Impression  1. Stable 4 cm rounded groundglass opacity in the left upper lobe. Given the  lack of interval change, a somewhat indolent process such bronchoalveolar  carcinoma should be considered. 2. Stable nonspecific 1 cm groundglass opacity in the right middle lobe. 3. Cholelithiasis      CT WITH CONTRAST:    No results found for this or any previous visit from the past 365 days. CT HIGH RES:    No results found for this or any previous visit from the past 365 days. CT PE PROTOCOL:    No results found for this or any previous visit from the past 365 days. LDCT SCREENING:    No results found for this or any previous visit from the past 365 days. PET SCAN:    No results found for this or any previous visit from the past 365 days. Spirometry:      No flowsheet data found. Exercise oximetry:          ASSESSMENT:   Diagnosis Orders   1. Chronic obstructive pulmonary disease, unspecified COPD type ([facility]     2. Bronchoalveolar carcinoma, left (HCC)     3. Lung nodule         PLAN:  · Will review case in cancer conference  · Follow-up with repeat chest CT in 6 months and will see in the office after that  · Continue Trelegy and albuterol  · On repeat CT scans if nodules appear to be enlarging he would be a candidate for SBRT    No orders of the defined types were placed in this encounter. No orders of the defined types were placed in this encounter.         Electronically signed by  Niru Tijerina MD

## 2022-09-27 ENCOUNTER — TELEPHONE (OUTPATIENT)
Dept: INTERNAL MEDICINE CLINIC | Facility: CLINIC | Age: 82
End: 2022-09-27

## 2022-09-27 NOTE — TELEPHONE ENCOUNTER
Pts wife called, they want to go to [facility] dr Sadia Santamaria (sp?). They need a referral and xray. Can we place these for pt or do they need to set up appt? Please call wife to let he know what needs done.

## 2022-09-28 ENCOUNTER — OFFICE VISIT (OUTPATIENT)
Dept: INTERNAL MEDICINE CLINIC | Facility: CLINIC | Age: 82
End: 2022-09-28
Payer: MEDICARE

## 2022-09-28 VITALS
BODY MASS INDEX: 25.9 KG/M2 | DIASTOLIC BLOOD PRESSURE: 80 MMHG | SYSTOLIC BLOOD PRESSURE: 148 MMHG | WEIGHT: 185 LBS | HEIGHT: 71 IN

## 2022-09-28 DIAGNOSIS — I25.5 ISCHEMIC CARDIOMYOPATHY: ICD-10-CM

## 2022-09-28 DIAGNOSIS — M25.551 HIP PAIN, RIGHT: Primary | ICD-10-CM

## 2022-09-28 DIAGNOSIS — M54.50 ACUTE RIGHT-SIDED LOW BACK PAIN WITHOUT SCIATICA: ICD-10-CM

## 2022-09-28 DIAGNOSIS — Z95.0 CARDIAC PACEMAKER: Primary | ICD-10-CM

## 2022-09-28 DIAGNOSIS — I48.91 ATRIAL FIBRILLATION (HCC): ICD-10-CM

## 2022-09-28 PROCEDURE — 99213 OFFICE O/P EST LOW 20 MIN: CPT | Performed by: INTERNAL MEDICINE

## 2022-09-28 PROCEDURE — G8417 CALC BMI ABV UP PARAM F/U: HCPCS | Performed by: INTERNAL MEDICINE

## 2022-09-28 PROCEDURE — G8427 DOCREV CUR MEDS BY ELIG CLIN: HCPCS | Performed by: INTERNAL MEDICINE

## 2022-09-28 PROCEDURE — 1123F ACP DISCUSS/DSCN MKR DOCD: CPT | Performed by: INTERNAL MEDICINE

## 2022-09-28 PROCEDURE — 1036F TOBACCO NON-USER: CPT | Performed by: INTERNAL MEDICINE

## 2022-09-28 ASSESSMENT — ENCOUNTER SYMPTOMS
BACK PAIN: 1
COUGH: 0
SHORTNESS OF BREATH: 0

## 2022-09-28 ASSESSMENT — PATIENT HEALTH QUESTIONNAIRE - PHQ9
1. LITTLE INTEREST OR PLEASURE IN DOING THINGS: 0
2. FEELING DOWN, DEPRESSED OR HOPELESS: 0
SUM OF ALL RESPONSES TO PHQ9 QUESTIONS 1 & 2: 0
SUM OF ALL RESPONSES TO PHQ QUESTIONS 1-9: 0

## 2022-09-28 NOTE — PROGRESS NOTES
SUBJECTIVE:   Brianna Wong is a 78 y.o. male seen for a visit regarding   Chief Complaint   Patient presents with    Hip Pain     Right-several weeks after working in his yard and now the pain has moved into the leg and asking for a referral to ortho in [facility]        Hip Pain   The incident occurred more than 1 week ago. The incident occurred in the yard (pulled weeds 6 h a month ago-few days later right hip hurt when moving). The pain is present in the right hip (lateral pain). Quality: can get sensation like electrocuted in right lower back when moves but better last few days. He has tried acetaminophen for the symptoms. Past Medical History, Past Surgical History, Family history, Social History, and Medications were all reviewed with the patient today and updated as necessary. Current Outpatient Medications   Medication Sig Dispense Refill    metFORMIN (GLUCOPHAGE-XR) 500 mg extended release tablet Take 2 tablets by mouth Daily with supper 180 tablet 3    hydroCHLOROthiazide (HYDRODIURIL) 25 MG tablet Take 1 tablet by mouth daily 90 tablet 4    albuterol sulfate  (90 Base) MCG/ACT inhaler Inhale 2 puffs into the lungs every 6 hours as needed      apixaban (ELIQUIS) 5 MG TABS tablet Take 5 mg by mouth 2 times daily      carvedilol (COREG) 25 MG tablet TAKE 1 TABLET BY MOUTH TWICE DAILY      dilTIAZem (TIAZAC) 240 MG extended release capsule Take 240 mg by mouth daily      fluticasone-umeclidin-vilant (TRELEGY ELLIPTA) 100-62.5-25 MCG/INH AEPB Inhale 1 puff into the lungs daily      glipiZIDE (GLUCOTROL) 5 MG tablet Take 2.5 mg by mouth 2 times daily      lisinopril (PRINIVIL;ZESTRIL) 30 MG tablet TAKE 1 TABLET BY MOUTH DAILY      lovastatin (MEVACOR) 40 MG tablet TAKE 1 TABLET BY MOUTH DAILY       No current facility-administered medications for this visit.      Allergies   Allergen Reactions    Quinolones Other (See Comments)     Pt denies  Pt denies       Patient Active Problem List   Diagnosis    Lumbar radiculopathy    HNP (herniated nucleus pulposus), lumbar    Essential hypertension with goal blood pressure less than 130/85    Atrial flutter, chronic (HCC)    Hx of CABG    Atrial fibrillation (HCC)    Chronic obstructive pulmonary disease (HCC)    S/P AAA (abdominal aortic aneurysm) repair    Diabetes (HCC)    Prostatitis    Acquired deformity of nose    SOB (shortness of breath)    Hyperlipidemia    Prostate cancer (HCC)    Non-rheumatic mitral regurgitation    Neuropathy    Transient ischemic attack    Lung nodule    Cardiac pacemaker    Abdominal aortic aneurysm without rupture (HCC)    Atherosclerosis of native coronary artery of native heart without angina pectoris    Type 2 diabetes mellitus with nephropathy (HCC)    Peripheral vascular disease (HCC)    Biventricular cardiac pacemaker in situ    Ischemic cardiomyopathy    Chronic kidney disease (CKD), stage II (mild)    Long term current use of anticoagulant    Status post biventricular pacemaker    Mixed hyperlipidemia    Dysuria    Personal history of tobacco use    Lumbar stenosis with neurogenic claudication    Encounter for pacemaker at end of battery life    Thrombocytopenia ([address]    Adenocarcinoma of left lung ([address]    Chronic renal disease, stage III ([address] [[id number]]     Social History     Tobacco Use    Smoking status: Former     Packs/day: 1.00     Years: 30.00     Pack years: 30.00     Types: Cigarettes     Quit date: 1/1/2007     Years since quitting: 15.7    Smokeless tobacco: Never   Substance Use Topics    Alcohol use: Yes     Alcohol/week: 24.0 standard drinks          Review of Systems   Respiratory:  Negative for cough and shortness of breath (on trelegy-COPD stable). Has 4 cm JUANCHO nodule ; 1 cm RML --suspect bronchoalveolar ca (pos biopsy 1/2022)-repeat scan 6 mo-Dr Yanira Phipps follows   Genitourinary:         Cr 1.4 done in spring-GFR 51   Musculoskeletal:  Positive for back pain (on right side). OBJECTIVE:  BP (!) 148/80   Ht 5' 11\" (1.803 m)   Wt 185 lb (83.9 kg)   BMI 25.80 kg/m²      Physical Exam  Musculoskeletal:         General: No tenderness (hip bursa ok on right). Normal range of motion. Medical problems and test results were reviewed with the patient today. ASSESSMENT and PLAN     1. Hip pain, right  2. Acute right-sided low back pain without sciatica   Tylenol works so far- bursa not tender -this may be lumbar pain referred -groin sx are absent -has ROM ok --has an ortho in [facility] that wanted an x ray first-but may not need yet so stay the course for now  Current treatment plan is effective. no changes in therapy  reviewed medications and side effects in detail     No follow-ups on file.

## 2022-10-07 DIAGNOSIS — Z95.0 PACEMAKER: ICD-10-CM

## 2022-10-07 DIAGNOSIS — I48.92 ATRIAL FLUTTER, CHRONIC (HCC): Primary | ICD-10-CM

## 2022-10-24 ENCOUNTER — TELEPHONE (OUTPATIENT)
Dept: INTERNAL MEDICINE CLINIC | Facility: CLINIC | Age: 82
End: 2022-10-24

## 2022-10-24 NOTE — TELEPHONE ENCOUNTER
----- Message from Stacia Romo sent at 10/24/2022  5:06 PM EDT -----  Regarding: current back pain  I just remembered I used tramadol once in the past and did not care for it. Please cancel my appointment for tomorrow and thank you for your time and advice. .... Christofer De La Cruz

## 2022-10-25 ENCOUNTER — OFFICE VISIT (OUTPATIENT)
Dept: INTERNAL MEDICINE CLINIC | Facility: CLINIC | Age: 82
End: 2022-10-25
Payer: MEDICARE

## 2022-10-25 VITALS
WEIGHT: 184 LBS | OXYGEN SATURATION: 97 % | HEART RATE: 81 BPM | BODY MASS INDEX: 25.66 KG/M2 | SYSTOLIC BLOOD PRESSURE: 124 MMHG | DIASTOLIC BLOOD PRESSURE: 80 MMHG

## 2022-10-25 DIAGNOSIS — M54.41 ACUTE RIGHT-SIDED LOW BACK PAIN WITH RIGHT-SIDED SCIATICA: Primary | ICD-10-CM

## 2022-10-25 PROCEDURE — 1036F TOBACCO NON-USER: CPT | Performed by: INTERNAL MEDICINE

## 2022-10-25 PROCEDURE — G8427 DOCREV CUR MEDS BY ELIG CLIN: HCPCS | Performed by: INTERNAL MEDICINE

## 2022-10-25 PROCEDURE — 99213 OFFICE O/P EST LOW 20 MIN: CPT | Performed by: INTERNAL MEDICINE

## 2022-10-25 PROCEDURE — G8417 CALC BMI ABV UP PARAM F/U: HCPCS | Performed by: INTERNAL MEDICINE

## 2022-10-25 PROCEDURE — 3078F DIAST BP <80 MM HG: CPT | Performed by: INTERNAL MEDICINE

## 2022-10-25 PROCEDURE — 3074F SYST BP LT 130 MM HG: CPT | Performed by: INTERNAL MEDICINE

## 2022-10-25 PROCEDURE — G8484 FLU IMMUNIZE NO ADMIN: HCPCS | Performed by: INTERNAL MEDICINE

## 2022-10-25 PROCEDURE — 1123F ACP DISCUSS/DSCN MKR DOCD: CPT | Performed by: INTERNAL MEDICINE

## 2022-10-25 ASSESSMENT — ENCOUNTER SYMPTOMS: BACK PAIN: 1

## 2022-10-25 NOTE — PROGRESS NOTES
SUBJECTIVE:   Aury Calixto is a 78 y.o. male seen for a visit regarding   Chief Complaint   Patient presents with    Back Pain     Back pain on right side        Back Pain  This is a new problem. The current episode started more than 1 month ago. The problem occurs daily. The problem has been gradually worsening (in past few days pain has radiated to right foot-am is worse and sciatica occurs and persists, varies over the day) since onset. The pain is present in the lumbar spine. Quality: pain level had been stable until the recent sciatica-was using tylenol and was 5/10 level-the sciatica at 10 level-tylenol helps some. The pain radiates to the right foot. Pertinent negatives include no numbness or weakness. Past Medical History, Past Surgical History, Family history, Social History, and Medications were all reviewed with the patient today and updated as necessary. Current Outpatient Medications   Medication Sig Dispense Refill    traMADol-acetaminophen (ULTRACET) 37.5-325 MG per tablet Take 1 tablet by mouth every 8 hours as needed for Pain for up to 14 days.  42 tablet 1    metFORMIN (GLUCOPHAGE-XR) 500 mg extended release tablet Take 2 tablets by mouth Daily with supper 180 tablet 3    hydroCHLOROthiazide (HYDRODIURIL) 25 MG tablet Take 1 tablet by mouth daily 90 tablet 4    albuterol sulfate  (90 Base) MCG/ACT inhaler Inhale 2 puffs into the lungs every 6 hours as needed      apixaban (ELIQUIS) 5 MG TABS tablet Take 5 mg by mouth 2 times daily      carvedilol (COREG) 25 MG tablet TAKE 1 TABLET BY MOUTH TWICE DAILY      dilTIAZem (TIAZAC) 240 MG extended release capsule Take 240 mg by mouth daily      fluticasone-umeclidin-vilant (TRELEGY ELLIPTA) 100-62.5-25 MCG/INH AEPB Inhale 1 puff into the lungs daily      glipiZIDE (GLUCOTROL) 5 MG tablet Take 2.5 mg by mouth 2 times daily      lisinopril (PRINIVIL;ZESTRIL) 30 MG tablet TAKE 1 TABLET BY MOUTH DAILY      lovastatin (MEVACOR) 40 MG OBJECTIVE:  /80   Pulse 81   Wt 184 lb (83.5 kg)   SpO2 97%   BMI 25.66 kg/m²      Physical Exam  Neurological:      Deep Tendon Reflexes: Reflexes normal (2 plus KJ;  absent ankles bilateral). Comments: Toe and ankle flexion ok on right          Medical problems and test results were reviewed with the patient today. ASSESSMENT and PLAN     1. Acute right-sided low back pain with right-sided sciatica  -     traMADol-acetaminophen (ULTRACET) 37.5-325 MG per tablet; Take 1 tablet by mouth every 8 hours as needed for Pain for up to 14 days. , Disp-42 tablet, R-1Normal  -     XR LUMBAR SPINE (MIN 4 VIEWS); Future   Use ultracet since sciatica now -refer to PT also-also pain managemnt in [place](to give us name of both)  reviewed medications and side effects in detail     Return in about 2 months (around 12/25/2022).

## 2022-10-27 ENCOUNTER — HOSPITAL ENCOUNTER (OUTPATIENT)
Dept: GENERAL RADIOLOGY | Age: 82
Discharge: HOME | End: 2022-10-30
Payer: MEDICARE

## 2022-10-27 DIAGNOSIS — M54.41 ACUTE RIGHT-SIDED LOW BACK PAIN WITH RIGHT-SIDED SCIATICA: ICD-10-CM

## 2022-10-27 PROCEDURE — 72110 X-RAY EXAM L-2 SPINE 4/>VWS: CPT

## 2022-11-04 ENCOUNTER — TELEPHONE (OUTPATIENT)
Dept: INTERNAL MEDICINE CLINIC | Facility: CLINIC | Age: 82
End: 2022-11-04

## 2022-11-04 NOTE — TELEPHONE ENCOUNTER
I called and spoke to pt and his wife re: need for him to complete two sections on the form from [employer] for patient Assistance Program I mailed him a copy of the paper work and I copied the form to scan into our chart

## 2022-11-07 ENCOUNTER — PATIENT MESSAGE (OUTPATIENT)
Dept: INTERNAL MEDICINE CLINIC | Facility: CLINIC | Age: 82
End: 2022-11-07

## 2022-11-15 ENCOUNTER — OFFICE VISIT (OUTPATIENT)
Dept: INTERNAL MEDICINE CLINIC | Facility: CLINIC | Age: 82
End: 2022-11-15
Payer: MEDICARE

## 2022-11-15 VITALS
DIASTOLIC BLOOD PRESSURE: 68 MMHG | HEART RATE: 78 BPM | SYSTOLIC BLOOD PRESSURE: 122 MMHG | BODY MASS INDEX: 25.17 KG/M2 | OXYGEN SATURATION: 99 % | WEIGHT: 180.5 LBS

## 2022-11-15 DIAGNOSIS — M54.41 ACUTE RIGHT-SIDED LOW BACK PAIN WITH RIGHT-SIDED SCIATICA: Primary | ICD-10-CM

## 2022-11-15 PROCEDURE — 99213 OFFICE O/P EST LOW 20 MIN: CPT | Performed by: INTERNAL MEDICINE

## 2022-11-15 PROCEDURE — G8484 FLU IMMUNIZE NO ADMIN: HCPCS | Performed by: INTERNAL MEDICINE

## 2022-11-15 PROCEDURE — G8427 DOCREV CUR MEDS BY ELIG CLIN: HCPCS | Performed by: INTERNAL MEDICINE

## 2022-11-15 PROCEDURE — 1036F TOBACCO NON-USER: CPT | Performed by: INTERNAL MEDICINE

## 2022-11-15 PROCEDURE — 1123F ACP DISCUSS/DSCN MKR DOCD: CPT | Performed by: INTERNAL MEDICINE

## 2022-11-15 PROCEDURE — G8417 CALC BMI ABV UP PARAM F/U: HCPCS | Performed by: INTERNAL MEDICINE

## 2022-11-15 PROCEDURE — 3074F SYST BP LT 130 MM HG: CPT | Performed by: INTERNAL MEDICINE

## 2022-11-15 PROCEDURE — 3078F DIAST BP <80 MM HG: CPT | Performed by: INTERNAL MEDICINE

## 2022-11-15 ASSESSMENT — PATIENT HEALTH QUESTIONNAIRE - PHQ9
SUM OF ALL RESPONSES TO PHQ9 QUESTIONS 1 & 2: 0
SUM OF ALL RESPONSES TO PHQ QUESTIONS 1-9: 0
SUM OF ALL RESPONSES TO PHQ QUESTIONS 1-9: 0
2. FEELING DOWN, DEPRESSED OR HOPELESS: 0
SUM OF ALL RESPONSES TO PHQ QUESTIONS 1-9: 0
1. LITTLE INTEREST OR PLEASURE IN DOING THINGS: 0
SUM OF ALL RESPONSES TO PHQ QUESTIONS 1-9: 0

## 2022-11-15 ASSESSMENT — ENCOUNTER SYMPTOMS: BACK PAIN: 1

## 2022-11-15 NOTE — PROGRESS NOTES
SUBJECTIVE:   Billy Avila is a 78 y.o. male seen for a visit regarding   Chief Complaint   Patient presents with    Arthritis     RT leg into RT foot. And LT foot tingling and numbness. Arthritis    Back Pain  This is a new problem. The current episode started more than 1 month ago. The problem occurs daily. The problem has been gradually worsening (in past few days pain has radiated to right foot-am is worse and sciatica occurs and persists, varies over the day) since onset. The pain is present in the lumbar spine. Quality: pain level had been stable until the recent sciatica-was using tylenol and was 5/10 level-the sciatica at 10 level-tylenol helps some. The pain radiates to the right foot. Pertinent negatives include no numbness or weakness. Tramadol given end of Oct --now at 2 tab 3 times a day ultracet-keeps pain at 2/10--is 8/10 off med. Has referral to Dr Dayanna Zhou in [place] -not contacted  Past Medical History, Past Surgical History, Family history, Social History, and Medications were all reviewed with the patient today and updated as necessary. Current Outpatient Medications   Medication Sig Dispense Refill    traMADol-acetaminophen (ULTRACET) 37.5-325 MG per tablet Take 2 tablets by mouth every 8 hours as needed for Pain for up to 90 days.  180 tablet 2    metFORMIN (GLUCOPHAGE-XR) 500 mg extended release tablet Take 2 tablets by mouth Daily with supper 180 tablet 3    hydroCHLOROthiazide (HYDRODIURIL) 25 MG tablet Take 1 tablet by mouth daily 90 tablet 4    albuterol sulfate  (90 Base) MCG/ACT inhaler Inhale 2 puffs into the lungs every 6 hours as needed      apixaban (ELIQUIS) 5 MG TABS tablet Take 5 mg by mouth 2 times daily      carvedilol (COREG) 25 MG tablet TAKE 1 TABLET BY MOUTH TWICE DAILY      dilTIAZem (TIAZAC) 240 MG extended release capsule Take 240 mg by mouth daily      fluticasone-umeclidin-vilant (TRELEGY ELLIPTA) 100-62.5-25 MCG/INH AEPB Inhale 1 puff into the lungs daily      glipiZIDE (GLUCOTROL) 5 MG tablet Take 2.5 mg by mouth 2 times daily      lisinopril (PRINIVIL;ZESTRIL) 30 MG tablet TAKE 1 TABLET BY MOUTH DAILY      lovastatin (MEVACOR) 40 MG tablet TAKE 1 TABLET BY MOUTH DAILY       No current facility-administered medications for this visit.      Allergies   Allergen Reactions    Quinolones Other (See Comments)     Pt denies  Pt denies       Patient Active Problem List   Diagnosis    Lumbar radiculopathy    HNP (herniated nucleus pulposus), lumbar    Essential hypertension with goal blood pressure less than 130/85    Atrial flutter, chronic (HCC)    Hx of CABG    Atrial fibrillation (HCC)    Chronic obstructive pulmonary disease (HCC)    S/P AAA (abdominal aortic aneurysm) repair    Diabetes (HCC)    Prostatitis    Acquired deformity of nose    SOB (shortness of breath)    Hyperlipidemia    Prostate cancer (HCC)    Non-rheumatic mitral regurgitation    Neuropathy    Transient ischemic attack    Lung nodule    Cardiac pacemaker    Abdominal aortic aneurysm without rupture    Atherosclerosis of native coronary artery of native heart without angina pectoris    Type 2 diabetes mellitus with nephropathy (HCC)    Peripheral vascular disease (HCC)    Biventricular cardiac pacemaker in situ    Ischemic cardiomyopathy    Chronic kidney disease (CKD), stage II (mild)    Long term current use of anticoagulant    Status post biventricular pacemaker    Mixed hyperlipidemia    Dysuria    Personal history of tobacco use    Lumbar stenosis with neurogenic claudication    Encounter for pacemaker at end of battery life    Thrombocytopenia ([facility]    Adenocarcinoma of left lung ([facility]    Chronic renal disease, stage III ([address] [[id number]]     Social History     Tobacco Use    Smoking status: Former     Packs/day: 1.00     Years: 30.00     Pack years: 30.00     Types: Cigarettes     Quit date: 1/1/2007     Years since quitting: 15.8    Smokeless tobacco: Never   Substance Use Topics Alcohol use: Yes     Alcohol/week: 24.0 standard drinks          Review of Systems   Musculoskeletal:  Positive for arthritis and back pain. OBJECTIVE:  /68   Pulse 78   Wt 180 lb 8 oz (81.9 kg)   SpO2 99%   BMI 25.17 kg/m²      Physical Exam       Medical problems and test results were reviewed with the patient today. ASSESSMENT and PLAN     1. Acute right-sided low back pain with right-sided sciatica  -     traMADol-acetaminophen (ULTRACET) 37.5-325 MG per tablet; Take 2 tablets by mouth every 8 hours as needed for Pain for up to 90 days. , Disp-180 tablet, R-2Normal   Uses 6 per day of ultracet and is more functional now and pain control much better-can walk farther , can drive now  reviewed medications and side effects in detail   [facility] has not called -can try [facility] if he can't get response-ok for the tramadol at this point -on eliquis  Return in about 3 months (around 2/15/2023).

## 2022-11-30 ENCOUNTER — OFFICE VISIT (OUTPATIENT)
Dept: CARDIOLOGY CLINIC | Age: 82
End: 2022-11-30

## 2022-11-30 VITALS
SYSTOLIC BLOOD PRESSURE: 134 MMHG | HEIGHT: 71 IN | WEIGHT: 183 LBS | BODY MASS INDEX: 25.62 KG/M2 | DIASTOLIC BLOOD PRESSURE: 68 MMHG | HEART RATE: 83 BPM

## 2022-11-30 DIAGNOSIS — I25.810 CORONARY ARTERY DISEASE INVOLVING CORONARY BYPASS GRAFT OF NATIVE HEART WITHOUT ANGINA PECTORIS: ICD-10-CM

## 2022-11-30 DIAGNOSIS — I48.21 PERMANENT ATRIAL FIBRILLATION (HCC): Primary | ICD-10-CM

## 2022-11-30 DIAGNOSIS — E78.2 MIXED HYPERLIPIDEMIA: ICD-10-CM

## 2022-11-30 DIAGNOSIS — I42.8 OTHER CARDIOMYOPATHY (HCC): ICD-10-CM

## 2022-11-30 ASSESSMENT — ENCOUNTER SYMPTOMS
BACK PAIN: 0
DOUBLE VISION: 0
NAUSEA: 0
ORTHOPNEA: 0
SHORTNESS OF BREATH: 0
BLOATING: 0
BLURRED VISION: 0
VOMITING: 0
COUGH: 0
ABDOMINAL PAIN: 0
HEMOPTYSIS: 0

## 2022-11-30 NOTE — PROGRESS NOTES
[facility] CARDIOLOGY  [address] [facility], [address]  PHONE: [phone]    22    NAME:  Fernanda Abbott  : 1940  MRN: [id number]         SUBJECTIVE:   Fernanda Abbott is a 78 y.o. male seen for a visit regarding the following:     Chief Complaint   Patient presents with    New Patient     Previous Cebe    Atrial Fibrillation     Pacer placed by Sandrita Harris     Hypertension     6 month       HPI:  (prior Cebe pt)    History of coronary disease with prior CABG [no records available; at [facility]]. Other history of AAA status post EVAR; appears underwent endovascular treatment of type II endoleak from 2016. Also stated permanent atrial fibrillation with prior CRT-P placement (underwent generator exchange from 2022; ~92% paced). Last on echocardiogram from 2022 with ejection fraction stated at 45-50%. Prior noted negative Cardiolite from 2017; EF stated 57%. Also DM, HTN, HLP. States mostly impeded with FRANCISCO with COPD. Stable symptoms. Denies any chest discomfort. No PND/orthopnea. No significant weight gain. Can walk for about a block on flat ground with his cane; some balance issues. Coronary disease-stable, atrial fibrillation-controlled, cardiomyopathy-not controlled    Past Medical History, Past Surgical History, Family history, Social History, and Medications were all reviewed with the patient today and updated as necessary.      No Known Allergies  Patient Active Problem List   Diagnosis    Lumbar radiculopathy    HNP (herniated nucleus pulposus), lumbar    Essential hypertension with goal blood pressure less than 130/85    Atrial flutter, chronic (HCC)    Hx of CABG    Atrial fibrillation (HCC)    Chronic obstructive pulmonary disease (HCC)    S/P AAA (abdominal aortic aneurysm) repair    Diabetes (HCC)    Prostatitis    Acquired deformity of nose    SOB (shortness of breath)    Hyperlipidemia    Prostate cancer (HCC)    Non-rheumatic mitral regurgitation    Neuropathy    Transient ischemic attack    Lung nodule    Cardiac pacemaker    Abdominal aortic aneurysm without rupture    Atherosclerosis of native coronary artery of native heart without angina pectoris    Type 2 diabetes mellitus with nephropathy (HCC)    Peripheral vascular disease (HCC)    Biventricular cardiac pacemaker in situ    Ischemic cardiomyopathy    Chronic kidney disease (CKD), stage II (mild)    Long term current use of anticoagulant    Status post biventricular pacemaker    Mixed hyperlipidemia    Dysuria    Personal history of tobacco use    Lumbar stenosis with neurogenic claudication    Encounter for pacemaker at end of battery life    Thrombocytopenia ([address]    Adenocarcinoma of left lung ([address]    Chronic renal disease, stage III ([address] [136789]     Past Medical History:   Diagnosis Date    AAA (abdominal aortic aneurysm)     leakage from stent repaired     Ambulates with cane     single cane    Anticoagulated on Coumadin     pt instructed to hold x 5 days     Aortic regurgitation 08/09/2016    mild to moderate per echo     Arthritis     Asthma     Atrial fibrillation ([address]     permanent- Pacemaker    Cardiac pacemaker 07/20/2016    medtronic - copy of card on chart- pacer interr 7.25.18-negative for pacer dependent    Chronic kidney disease (CKD), stage II (mild)     pt denies    Chronic obstructive pulmonary disease (HCC)     inhalers    Coronary atherosclerosis of native coronary vessel     Followed by [facility]    Essential hypertension     managed well with meds    Former smoker     35 yrs, 1 pk per day.  quit 2006    Hearing impairment     hearing aids to hemant ears    History of AAA (abdominal aortic aneurysm) repair 2014    EVAR    History of prostate cancer 2003    treated with radiation    History of TIA (transient ischemic attack) 2004    no residual    [place] (hard of hearing)     hemant hearing aides    Hx of CABG     6 vessel    Hyperlipidemia     Ischemic cardiomyopathy Lower back pain     Mitral regurgitation 08/09/2016    moderate per echo     Skin abnormalities     Type 2 diabetes mellitus ([address]     oral agent/AVG BS: 120/no s.s of hypoglycemia/Last A1c:6.0    Vision abnormalities      Past Surgical History:   Procedure Laterality Date    ABDOMINAL AORTIC ANEURYSM REPAIR  2014    EVAR- currently has leakage. attempt to repair in 5/2016    CATARACT REMOVAL Bilateral     COLONOSCOPY      CORONARY ARTERY BYPASS GRAFT  2006    x 6 vessels    LUMBAR LAMINECTOMY  01/11/2019    right L4-5 lami for stenosis and CSF leak repair    PACEMAKER  02/15/2012    PACEMAKER PLACEMENT  2005    [place] CARDIAC SURG PROCEDURE UNLIST      pacemaker placed     Family History   Problem Relation Age of Onset    Pneumonia Father     Asthma Father     Heart Disease Mother     Diabetes Mother     Gall Bladder Disease Mother     Cancer Maternal Grandmother     Diabetes Brother      Social History     Tobacco Use    Smoking status: Former     Packs/day: 1.00     Years: 30.00     Pack years: 30.00     Types: Cigarettes     Quit date: 1/1/2007     Years since quitting: 15.9    Smokeless tobacco: Never   Substance Use Topics    Alcohol use: Yes     Alcohol/week: 24.0 standard drinks     Current Outpatient Medications   Medication Sig Dispense Refill    traMADol-acetaminophen (ULTRACET) 37.5-325 MG per tablet Take 2 tablets by mouth every 8 hours as needed for Pain for up to 90 days.  180 tablet 2    metFORMIN (GLUCOPHAGE-XR) 500 mg extended release tablet Take 2 tablets by mouth Daily with supper 180 tablet 3    hydroCHLOROthiazide (HYDRODIURIL) 25 MG tablet Take 1 tablet by mouth daily 90 tablet 4    albuterol sulfate  (90 Base) MCG/ACT inhaler Inhale 2 puffs into the lungs every 6 hours as needed      apixaban (ELIQUIS) 5 MG TABS tablet Take 5 mg by mouth 2 times daily      carvedilol (COREG) 25 MG tablet TAKE 1 TABLET BY MOUTH TWICE DAILY      dilTIAZem (TIAZAC) 240 MG extended release capsule Take 240 mg by mouth daily      fluticasone-umeclidin-vilant (TRELEGY ELLIPTA) 100-62.5-25 MCG/INH AEPB Inhale 1 puff into the lungs daily      glipiZIDE (GLUCOTROL) 5 MG tablet Take 2.5 mg by mouth 2 times daily      lisinopril (PRINIVIL;ZESTRIL) 30 MG tablet TAKE 1 TABLET BY MOUTH DAILY      lovastatin (MEVACOR) 40 MG tablet TAKE 1 TABLET BY MOUTH DAILY       No current facility-administered medications for this visit. Review of Systems   Constitutional: Negative for chills, decreased appetite, fever, malaise/fatigue and weight gain. HENT:  Negative for nosebleeds. Eyes:  Negative for blurred vision and double vision. Cardiovascular:  Positive for dyspnea on exertion. Negative for chest pain, claudication, leg swelling, orthopnea, palpitations, paroxysmal nocturnal dyspnea and syncope. Respiratory:  Negative for cough, hemoptysis and shortness of breath. Endocrine: Negative for cold intolerance and heat intolerance. Hematologic/Lymphatic: Negative for bleeding problem. Skin:  Negative for rash. Musculoskeletal:  Negative for back pain, joint pain, muscle weakness and myalgias. Gastrointestinal:  Negative for bloating, abdominal pain, nausea and vomiting. Genitourinary:  Negative for dysuria. Neurological:  Negative for dizziness, light-headedness and weakness. Psychiatric/Behavioral:  Negative for altered mental status. PHYSICAL EXAM:    /68   Pulse 83   Ht 5' 11\" (1.803 m)   Wt 183 lb (83 kg)   BMI 25.52 kg/m²      Physical Exam  Constitutional:       Appearance: Normal appearance. HENT:      Head: Normocephalic and atraumatic. Mouth/Throat:      Mouth: Mucous membranes are moist.   Eyes:      Pupils: Pupils are equal, round, and reactive to light. Cardiovascular:      Rate and Rhythm: Normal rate and regular rhythm. Pulses: Normal pulses. Pulmonary:      Effort: Pulmonary effort is normal.      Breath sounds: Normal breath sounds.    Abdominal:      General: Bowel sounds are normal. There is no distension. Palpations: Abdomen is soft. Tenderness: There is no abdominal tenderness. Musculoskeletal:         General: No swelling. Cervical back: Normal range of motion. Skin:     General: Skin is warm and dry. Neurological:      General: No focal deficit present. Mental Status: He is alert and oriented to person, place, and time. Medical problems and test results were reviewed with the patient today. No results found for this or any previous visit (from the past 672 hour(s)). Lab Results   Component Value Date/Time    CHOL 126 05/12/2022 01:39 PM    HDL 39 05/12/2022 01:39 PM    VLDL 31 05/12/2022 01:39 PM       No results found for any visits on 11/30/22. ASSESSMENT and PLAN    Lennox Primas was seen today for new patient, atrial fibrillation and hypertension. Diagnoses and all orders for this visit:    Permanent atrial fibrillation ([address]  -     EKG 12 lead  -     CLINIC PERFORMED - Transthoracic echocardiogram (TTE) complete with contrast, bubble, strain, and 3D PRN; Future    Other cardiomyopathy ([address]  -     CLINIC PERFORMED - Transthoracic echocardiogram (TTE) complete with contrast, bubble, strain, and 3D PRN; Future    Coronary artery disease involving coronary bypass graft of native heart without angina pectoris    Mixed hyperlipidemia      Overall Impression    Coronary disease-prior CABG. No records available. Continue aspirin/statin therapy. Cardiomyopathy-mild per last echocardiogram from 1/2022. Previous studies with preserved left ventricular function. Plan repeat study. Discussed will address medication changes based on EF. Continue current therapy at this time. Exam euvolemic. Permanent atrial fibrillation-predominantly biventricular paced. Continue Eliquis. AAA-prior EVAR. Hyperlipidemia-continue current moderate intensity statin.   Last noted LDL at 56 from 5/12/2022    Other-last hemoglobin A1c noted at 6.1 from 5/12/2022    Spent over 40 minutes with patient care. Initial evaluation of complicated patient with extensive review of prior records and discussion as stated above. Return in about 6 weeks (around 1/11/2023).      Ken Pascual MD  11/30/2022  11:25 PM

## 2022-12-05 NOTE — DISCHARGE INSTRUCTIONS
[address]  Department of Interventional Radiology  [facility]  [phone] Office  [phone] Fax    Steroid Injection Discharge Instructions    General Information:   A steroid injection was performed today, placing a combination of a steroid and an anesthetic (numbing medicine) into the space around the nerves of your spine. This is done to treat back pain. It may take 7-10 days for the injection to reach its full potential.  This procedure can be done at any level of the spinal column, depending on where your pain is. Your doctor will have ordered the appropriate level to be treated prior to your coming in for the procedure. Home Care Instructions: You can resume your regular diet. Do not drink alcohol. You may notice that you have to use your pain medications less after your injection. Some people do not notice much of a change in their pain after the first injection. If that is the case, it is worth your time to have a second one done. This is why these injections are sometimes ordered in a series of three. Keep the puncture site clean and dry for 24 hours, and then you may remove the dressing. Showering is acceptable after the bandage is removed. Call If:   You should call your Physician and/or the Radiology Nurse if you have any bleeding other than a small spot on your bandage. Call if you have any signs of infection, fever, increased pain at the puncture site, confusion, or a headache that worsens when you stand and eases when lying flat. Follow-Up Instructions:  Please see your ordering doctor as he/she has requested. Let your doctor know if you have relief from your pain so they may schedule another injection for you if it is indicated. To Reach Us: If you have any questions about your procedure, please call the Interventional Radiology department at [phone].  After business hours (5pm) and weekends, Per physician instructions      If you have questions or concerns on any instructions given to you by your provider today or if you need to schedule an appointment, you can reach us at [phone].       James is a 41 year old male who is status post laparoscopic appendectomy  Inflamed not ruptured  Bilateral double hernias no bowel in the hernias with no chills and no fever.      Patient's  Pain is  improving.  Appetite is  improving.    Wound(s)  Is/are   clean dry and intact with no evidence of infection.    Questions were answered and discussion of no lifting more than 20 pounds for  3 weeks after surgery.        Path report shows:  Appendix, appendectomy:   -Acute appendicitis with serositis     I do not feel an obvious hernia but the laparoscopic look showed them.   When you want the bilateral inguinal hernia done or start having problems with them then I recommend we do this with  the robot    Risks of surgery include damage to nerves, bleeding, infection, damage to  Vessels, recurrence.  Although mesh is a better long term repair if it gets infected it must be removed.   If the patient has any bacterial infection the week before and is seen by their doctor and started on antibiotics, I can probably still do the surgery if they are vastly improved by the time of surgery, but if the infection starts closer to the surgery date it will be better to cancel and reschedule to a later date.  A cough will also be hard on the repair and uncomfortable post operative.  If the patient is a smoker I did discuss increase risk of recurrence and more pain with the cough.  If the patient is willing to quit smoking would encourage to do so and start at least a week before surgery.  However, if patient is not going to quit then must understand that his repair is more likely to fail.    Risks of surgery discussed including, but not limited to bleeding, infection, recurrence, damage to nerves and what is in the hernia sac.   Risks of anesthesia also discussed.    Discussed massaging hernia back in and using ice if becomes more painful.  If not able to reduce then go to emergency room.  Also discussed hernia belt to use until able to get in for surgery.    Things you will need to do before surgery are getting a preoperative appointment with your primary care doctor to be cleared for surgery.    You will also need a COVID test before surgery and an appointment to see me usually about 2 weeks after the procedure to make sure you are doing well.   Fortunately, when the schedulers call you they will try to get all this set up for you at that one call.     If you have had a positive COVID test in a 90 day window that would include the date of the procedure, let us know that and will need to see proof of this.    Then you do not need a COVID test.  But if over 90 days you do.       call the answering service at [phone] and ask for the Radiologist on call to be paged. Interventional Radiology General Nurse Discharge    After general anesthesia or intravenous sedation, for 24 hours or while taking prescription Narcotics:  · Limit your activities  · Do not drive and operate hazardous machinery  · Do not make important personal or business decisions  · Do  not drink alcoholic beverages  · If you have not urinated within 8 hours after discharge, please contact your surgeon on call. * Please give a list of your current medications to your Primary Care Provider. * Please update this list whenever your medications are discontinued, doses are     changed, or new medications (including over-the-counter products) are added. * Please carry medication information at all times in case of emergency situations. These are general instructions for a healthy lifestyle:    No smoking/ No tobacco products/ Avoid exposure to second hand smoke  Surgeon General's Warning:  Quitting smoking now greatly reduces serious risk to your health. Obesity, smoking, and sedentary lifestyle greatly increases your risk for illness  A healthy diet, regular physical exercise & weight monitoring are important for maintaining a healthy lifestyle    You may be retaining fluid if you have a history of heart failure or if you experience any of the following symptoms:  Weight gain of 3 pounds or more overnight or 5 pounds in a week, increased swelling in our hands or feet or shortness of breath while lying flat in bed. Please call your doctor as soon as you notice any of these symptoms; do not wait until your next office visit. Recognize signs and symptoms of STROKE:  F-face looks uneven    A-arms unable to move or move unevenly    S-speech slurred or non-existent    T-time-call 911 as soon as signs and symptoms begin-DO NOT go       Back to bed or wait to see if you get better-TIME IS BRAIN.            Patient Signature:  Date: 11/26/2018  Discharging Nurse: Lindsay Tinajero

## 2022-12-08 NOTE — PROCEDURES
"Progress Note  [facility]    Subjective:      Aureliano Wilson is a 61 y.o. male here     #HLD: see below for latest ASCVD Risk score; patient currently smoking 5 cigarettes/day and has smoked since age 14; not currently on statin     Review of Systems   Constitutional:  Negative for chills and fever.   Respiratory:  Negative for cough and shortness of breath.    Cardiovascular:  Negative for chest pain and leg swelling.   Gastrointestinal:  Negative for abdominal pain, constipation, diarrhea, nausea and vomiting.   Genitourinary:  Negative for dysuria.   Musculoskeletal:  Negative for myalgias.       Objective:   /82   Pulse 72   Ht 5' 11" (1.803 m)   Wt 83.5 kg (184 lb 1.4 oz)   BMI 25.67 kg/m²      Physical Exam  Vitals reviewed.   Constitutional:       General: He is not in acute distress.     Appearance: Normal appearance. He is not ill-appearing, toxic-appearing or diaphoretic.   HENT:      Head: Normocephalic and atraumatic.      Right Ear: External ear normal.      Left Ear: External ear normal.      Nose: Nose normal.      Mouth/Throat:      Mouth: Mucous membranes are moist.   Eyes:      Extraocular Movements: Extraocular movements intact.   Cardiovascular:      Rate and Rhythm: Normal rate.      Pulses: Normal pulses.   Pulmonary:      Effort: Pulmonary effort is normal. No respiratory distress.   Abdominal:      Tenderness: There is no guarding.   Musculoskeletal:         General: Normal range of motion.      Cervical back: Normal range of motion.   Skin:     General: Skin is warm.      Capillary Refill: Capillary refill takes less than 2 seconds.   Neurological:      Mental Status: He is alert and oriented to person, place, and time.   Psychiatric:         Mood and Affect: Mood normal.         Behavior: Behavior normal.        The 10-year ASCVD risk score (Lo SONG, et al., 2019) is: 16.2%    Values used to calculate the score:      Age: 61 years      Sex: Male      Is Non- " PROCEDURE  Bronchoscopy with Endobronchial Ultrasound Guided Fine Needle Aspiration Of Medistinal Lymph nodes and airway inspection and EMN aided biopsy of peripheral lung nodule. EQUIPMENT:  Olympus T 190 bronchoscope, [employer] Dimension EMN system, 90 deg steareble sheath, Olympus [id number] EBUS scope, UM 17 Radial probe, Super D forceps, Super D triple needle brush, Fluoroscopy. INDICATION   Peripheral nodule/mass suspicious for malignancy/staging of presumed malignancy  +      POST OP DIAGNOSIS:  Super Dimension EMN system was employed to identify and biopsy the SHAE GGO on CT. 10 forceps  Biopsies(first 5 assessed on ROCKY), 1 triple needle  brushings and  A BAL were performed with touch preparations revealing suspicious and atypical  cells on ROCKY. Histology from obtained  Biopsies is pending. Following EMN procedure, linear EBUS was performed for mediastinal staging. Stations 4R,7 and 11L, were biopsied and nrgative for malignancy on ROCKY. Based on CT chest and EBUS  I suspect STAGE Adenocarcinoma in situ. Await final path. ANESTHESIA  Concious sedation with: Fentanyl 200  mcg IV; Versed 4 mg IV; Lidocaine 200 mg to tracheo-bronchial tree and vocal cords. AIRWAY INSPECTION  After obtaining informed consent, using a bite block, an Olympus H 190 video bronchoscope was introduced into the trachea through the vocal cords without complication.     RIGHT  LOCATION NORM/ABNORMAL DESCRIPTION   VOCAL CORDS NL    TRACHEA NL    JOY NL    RMSB NL    RUL NL    BI NL    RML NL    SUP SEGM RLL NL    MED BASAL NL    ANTERIOR BASAL NL    LATERAL BASAL NL    POSTERIOR BASAL NL                  LEFT  LOCATION NORMAL/ABNORMAL TYPE   LMSB NL    SHAE NL    LINGULA NL    SUPERIOR DIVISION NL    SUPERIOR SEG LLL NL    ELMA-MEDIAL LLL NL    LATERAL LLL NL    POSTERIOR LLL NL                         EBUS  After completing the airway inspection an Olympus  F EBUS bronchoscope was introduced into the : No      Diabetic: No      Tobacco smoker: Yes      Systolic Blood Pressure: 129 mmHg      Is BP treated: No      HDL Cholesterol: 59 mg/dL      Total Cholesterol: 231 mg/dL  5-7.4% - shared decision making, consider mod-intensity statin  7.5-14.9% - shared decision making, consider mod-high inten statin (if +diab 40-76 yo, initiate or cont mod/high inten statin)  >15% - initiate or cont mod-high inten statin... consider high-inten statin (if +diab 40-76 yo, initiate or cont high inten statin)  if +diab 40-76 yo, + LDL  - initiate or cont mod inten statin  if +diab 40-76 yo, + LDL >190 - initiate or cont high inten statin      Assessment:   61 y.o. with        1. Mixed hyperlipidemia         Plan:   Aureliano was seen today for results.    Diagnoses and all orders for this visit:    Mixed hyperlipidemia  -     atorvastatin (LIPITOR) 40 MG tablet; Take 1 tablet (40 mg total) by mouth every evening.  - Recommend tobacco cessation       Follow up in about 6 months (around 6/8/2023) for check up.    Enrique Mejia DO  [facility], PGY-3  4:43 PM, 12/08/2022    *This note was dictated using the M*Modal Fluency Direct word recognition program. There are word rescognition mistakes that are occasionally missed on review.     The following information is provided to all patients.  This information is to help you find resources for any of the problems found today that may be affecting your health:                Living healthy guide: www.[facility].[place].gov       Understanding Diabetes: www.diabetes.org       Eating healthy: www.cdc.gov/healthyweight      CDC home safety checklist: www.cdc.gov/steadi/patient.html      Agency on Aging: www.goea.[place].[facility]       Alcoholics anonymous (AA): www.aa.org      Physical Activity: www.nancy.nih.gov/xd3ryze       Tobacco use: www.quitwithusla.org          trachea through the vocal chords without complication. The balloon was inflated with saline and a mediastinal inspection commenced:      STATION SIZE IN CM   11R sup No tgt   11R inf No tgt   10R No tgt   7 3.0/3.0   4R 1.0/1.0   11L 1.0/1.0   10L No tgt   4L No tgt   2L No tgt   2R No tgt         After identifying targets the following samples were obtained:    STATION PASS# LYMPHOCYTES MALIGNANT ATYPICAL GRANULOMA NONDGNSTC   4R 1 + - - -     2 + - - -     3 + - - -             7 1 + - - -     2 + - - -     3 + - - -             11L 1 - - - - blood    2 - - - - Bronch epith    3 - - - - blood    4 - - - - blood       © 2018 UpToDate, Inc. and/or its affiliates. All Rights Reserved. T, N, and M descriptors for the eighth edition of TNM classification for lung cancer    T: Primary tumor   Tx Primary tumor cannot be assessed or tumor proven by presence of malignant cells in sputum or bronchial washings but not visualized by imaging or bronchoscopy   T0 No evidence of primary tumor   Tis Carcinoma in situ   T1 Tumor ? 3 cm in greatest dimension surrounded by lung or visceral pleura without bronchoscopic evidence of invasion more proximal than the lobar bronchus (ie, not in the main bronchus)*   T1a(mi) Minimally invasive adenocarcinoma¶   T1a Tumor ? 1 cm in greatest dimension*   T1b Tumor >1 cm but ?2 cm in greatest dimension*   T1c Tumor >2 cm but ?3 cm in greatest dimension*   T2 Tumor >3 cm but ?5 cm or tumor with any of the following features:?  · Involves main bronchus regardless of distance from the rocky but without involvement of the rocky  · Invades visceral pleura  · Associated with atelectasis or obstructive pneumonitis that extends to the hilar region, involving part or all of the lung   T2a Tumor >3 cm but ?4 cm in greatest dimension   T2b Tumor >4 cm but ?5 cm in greatest dimension   T3 Tumor >5 cm but ?7 cm in greatest dimension or associated with separate tumor nodule(s) in the same lobe as the primary tumor or directly invades any of the following structures: chest wall (including the parietal pleura and superior sulcus tumors), phrenic nerve, parietal pericardium   T4 Tumor >7 cm in greatest dimension or associated with separate tumor nodule(s) in a different ipsilateral lobe than that of the primary tumor or invades any of the following structures: diaphragm, mediastinum, heart, great vessels, trachea, recurrent laryngeal nerve, esophagus, vertebral body, and rocky   N: Regional lymph node involvement   Nx Regional lymph nodes cannot be assessed   N0 No regional lymph node metastasis   N1 Metastasis in ipsilateral peribronchial and/or ipsilateral hilar lymph nodes and intrapulmonary nodes, including involvement by direct extension   N2 Metastasis in ipsilateral mediastinal and/or subcarinal lymph node(s)   N3 Metastasis in contralateral mediastinal, contralateral hilar, ipsilateral or contralateral scalene, or supraclavicular lymph node(s)   M: Distant metastasis   M0 No distant metastasis   M1 Distant metastasis present   M1a Separate tumor nodule(s) in a contralateral lobe; tumor with pleural or pericardial nodule(s) or malignant pleural or pericardial effusion? M1b Single extrathoracic metastasis§   M1c Multiple extrathoracic metastases in one or more organs   Stage groupings   Occult carcinoma TX N0 M0   Stage 0 Tis N0 M0   Stage IA1 T1a(mi) N0 M0    T1a N0 M0   Stage IA2 T1b N0 M0   Stage IA3 T1c N0 M0   Stage IB T2a N0 M0   Stage IIA T2b N0 M0   Stage IIB T1a to c N1 M0    T2a N1 M0    T2b N1 M0    T3 N0 M0   Stage IIIA T1a to c N2 M0    T2a to b N2 M0    T3 N1 M0    T4 N0 M0    T4 N1 M0   Stage IIIB T1a to c N3 M0    T2a to b N3 M0    T3 N2 M0    T4 N2 M0   Stage IIIC T3 N3 M0    T4 N3 M0   Stage ANU Any T Any N M1a    Any T Any N M1b   Stage IVB Any T Any N M1c   NOTE: Changes to the seventh edition are in bold. TNM: tumor, node, metastasis;  Tis: carcinoma in situ; T1a(mi): minimally invasive adenocarcinoma. * The uncommon superficial spreading tumor of any size with its invasive component limited to the bronchial wall, which may extend proximal to the main bronchus, is also classified as T1a. ¶ Solitary adenocarcinoma, ?3 cm with a predominately lepidic pattern and ?5 mm invasion in any one focus. ? T2 tumors with these features are classified as T2a if ?4 cm in greatest dimension or if size cannot be determined, and T2b if >4 cm but ?5 cm in greatest dimension. ? Most pleural (pericardial) effusions with lung cancer are due to tumor. In a few patients, however, multiple microscopic examinations of pleural (pericardial) fluid are negative for tumor and the fluid is nonbloody and not an exudate. When these elements and clinical judgment dictate that the effusion is not related to the tumor, the effusion should be excluded as a staging descriptor. § This includes involvement of a single distant (nonregional) lymph node. Reproduced from: Abida Gamble et al. The IASLC Lung Cancer Staging Project: Proposals for Revision of the TNM Stage Groupings in the Forthcoming (Eighth) Edition of the TNM Classification for Lung Cancer. J Thorac Oncol 2016; 11:39. Table used with the permission of [employer]. All rights reserved. Graphic 914371 Version 1.0                                         Olympus T 190   bronchoscope wasintroduced into the airways to identify and biopsy the LULn GGO nodule with the aid of Super D EMN system and radial probe US. CT image was acquired on with Super D protocol were used and the pathway to target  planned using super D planning software. 10 forceps  Biopsies(first 5 assessed on ROCKY), 1 triple needle  brushings and  A BAL were performed with touch preparations revealing suspicious and atypical  cells on ROCKY. Histology from obtained  Biopsies is pending. Histology from obtained tissue is currently pending.     TOUCH PREP BIOPSY# MALIGNANT SUSPICIOUS ATYPIA NONDGNSTC   SHAE GGO NODULE FORCEPS 1 - - - Bronch Epith    2 - ++ -     3 - - +     4 - - +     5 - - +     6 -- -- -- In tot for histo and IHC possible genetic analysis    7 -- -- -- \"    8 -- -- -- \"    9 -- -- -- \"    10 -- -- -- \"           SHAE GGO TRIPLE NEEDLE BRUSH 1 -- -- -- In toto for cyto           SHAE GGO BAL 1 -- -- -- In toto for cyto                     EBL: 20 ml    Complications: NONE with no evidence of pneumothorax on post-op flouroscopy.     Nettie Beach MD.

## 2022-12-09 DIAGNOSIS — Z95.0 CARDIAC PACEMAKER: ICD-10-CM

## 2022-12-09 DIAGNOSIS — I42.8 OTHER CARDIOMYOPATHY (HCC): Primary | ICD-10-CM

## 2022-12-27 ENCOUNTER — HOSPITAL ENCOUNTER (OUTPATIENT)
Dept: CT IMAGING | Age: 82
Discharge: HOME | End: 2022-12-30
Payer: MEDICARE

## 2022-12-27 DIAGNOSIS — R91.1 LUNG NODULE: ICD-10-CM

## 2022-12-27 DIAGNOSIS — C34.92 BRONCHOALVEOLAR CARCINOMA, LEFT (HCC): ICD-10-CM

## 2022-12-27 PROCEDURE — 71250 CT THORAX DX C-: CPT

## 2023-01-15 RX ORDER — LOVASTATIN 20 MG/1
20 TABLET ORAL NIGHTLY
Qty: 90 TABLET | Refills: 4 | Status: SHIPPED | OUTPATIENT
Start: 2023-01-15

## 2023-01-18 ENCOUNTER — OFFICE VISIT (OUTPATIENT)
Dept: PULMONOLOGY | Age: 83
End: 2023-01-18
Payer: MEDICARE

## 2023-01-18 ENCOUNTER — CLINICAL DOCUMENTATION (OUTPATIENT)
Dept: PULMONOLOGY | Age: 83
End: 2023-01-18

## 2023-01-18 VITALS
SYSTOLIC BLOOD PRESSURE: 155 MMHG | RESPIRATION RATE: 14 BRPM | HEIGHT: 71 IN | BODY MASS INDEX: 25.9 KG/M2 | TEMPERATURE: 98 F | OXYGEN SATURATION: 94 % | HEART RATE: 76 BPM | DIASTOLIC BLOOD PRESSURE: 83 MMHG | WEIGHT: 185 LBS

## 2023-01-18 DIAGNOSIS — C34.31 MALIGNANT NEOPLASM OF LOWER LOBE, RIGHT BRONCHUS OR LUNG (HCC): ICD-10-CM

## 2023-01-18 DIAGNOSIS — J44.9 CHRONIC OBSTRUCTIVE PULMONARY DISEASE, UNSPECIFIED COPD TYPE (HCC): Primary | ICD-10-CM

## 2023-01-18 DIAGNOSIS — C34.92 BRONCHOALVEOLAR CARCINOMA, LEFT (HCC): ICD-10-CM

## 2023-01-18 PROCEDURE — 3079F DIAST BP 80-89 MM HG: CPT | Performed by: INTERNAL MEDICINE

## 2023-01-18 PROCEDURE — G8417 CALC BMI ABV UP PARAM F/U: HCPCS | Performed by: INTERNAL MEDICINE

## 2023-01-18 PROCEDURE — G8484 FLU IMMUNIZE NO ADMIN: HCPCS | Performed by: INTERNAL MEDICINE

## 2023-01-18 PROCEDURE — 3023F SPIROM DOC REV: CPT | Performed by: INTERNAL MEDICINE

## 2023-01-18 PROCEDURE — 99214 OFFICE O/P EST MOD 30 MIN: CPT | Performed by: INTERNAL MEDICINE

## 2023-01-18 PROCEDURE — 1123F ACP DISCUSS/DSCN MKR DOCD: CPT | Performed by: INTERNAL MEDICINE

## 2023-01-18 PROCEDURE — 1036F TOBACCO NON-USER: CPT | Performed by: INTERNAL MEDICINE

## 2023-01-18 PROCEDURE — G8427 DOCREV CUR MEDS BY ELIG CLIN: HCPCS | Performed by: INTERNAL MEDICINE

## 2023-01-18 PROCEDURE — 3077F SYST BP >= 140 MM HG: CPT | Performed by: INTERNAL MEDICINE

## 2023-01-18 RX ORDER — FLUTICASONE FUROATE, UMECLIDINIUM BROMIDE AND VILANTEROL TRIFENATATE 100; 62.5; 25 UG/1; UG/1; UG/1
1 POWDER RESPIRATORY (INHALATION) DAILY
Qty: 3 EACH | Refills: 3 | Status: SHIPPED | OUTPATIENT
Start: 2023-01-18

## 2023-01-18 NOTE — TELEPHONE ENCOUNTER
I left pt a voice message and I also sent him a My Chart message letting him know Dr Melissa Britt reduced his Lovastatin form 40 mg to 20 mg daily and new rx was sent

## 2023-01-18 NOTE — PROGRESS NOTES
[facility]: FOLLOW-UP Patient Office Visit Note  [address] [facility] [place], [address] [facility]  [phone]    Patient Name:  Stacia Da Silva  YOB: 1940            Date of Service:  1/18/2023    Chief Complaint   Patient presents with    Follow-up    COPD       History of Present Illness: This is an 78-year-old white male initially seen by me in 2021 and previously followed in [facility] by Dr. Jose Roberto Joy. .  The patient has COPD and a CT scan was done after his initial visit. This showed some groundglass opacities so repeat CT was done in December 3 months later demonstrating some increase in size and groundglass densities. Patient had navigation biopsy which was positive for alveolar cell. Patient was reviewed in cancer conference and it was felt this was an indolent process and that he should have follow-up CT scans with 1 being done in March and then again on June 23 and most recently 12/27/22   The scans have shown continued stability of the groundglass opacities    Currently patient is doing fairly well with no significant change in his chronic dyspnea on exertion. He is using Trelegy and albuterol. No significant cough or chest pain.     Past Medical History:   Diagnosis Date    AAA (abdominal aortic aneurysm)     leakage from stent repaired     Ambulates with cane     single cane    Anticoagulated on Coumadin     pt instructed to hold x 5 days     Aortic regurgitation 08/09/2016    mild to moderate per echo     Arthritis     Asthma     Atrial fibrillation ([address]     permanent- Pacemaker    Cardiac pacemaker 07/20/2016    medtronic - copy of card on chart- pacer interr 7.25.18-negative for pacer dependent    Chronic kidney disease (CKD), stage II (mild)     pt denies    Chronic obstructive pulmonary disease (HCC)     inhalers    Coronary atherosclerosis of native coronary vessel     Followed by [facility]    Essential hypertension     managed well with meds Former smoker     35 yrs, 1 pk per day.  quit 2006    Hearing impairment     hearing aids to hemant ears    History of AAA (abdominal aortic aneurysm) repair 2014    EVAR    History of prostate cancer 2003    treated with radiation    History of TIA (transient ischemic attack) 2004    no residual    [place] (hard of hearing)     hemant hearing aides    Hx of CABG     6 vessel    Hyperlipidemia     Ischemic cardiomyopathy     Lower back pain     Mitral regurgitation 08/09/2016    moderate per echo     Skin abnormalities     Type 2 diabetes mellitus (HCC)     oral agent/AVG BS: 120/no s.s of hypoglycemia/Last A1c:6.0    Vision abnormalities        Patient Active Problem List   Diagnosis    Lumbar radiculopathy    HNP (herniated nucleus pulposus), lumbar    Essential hypertension with goal blood pressure less than 130/85    Atrial flutter, chronic (HCC)    Hx of CABG    Atrial fibrillation (HCC)    Chronic obstructive pulmonary disease (HCC)    S/P AAA (abdominal aortic aneurysm) repair    Diabetes (HCC)    Prostatitis    Acquired deformity of nose    SOB (shortness of breath)    Hyperlipidemia    Prostate cancer (HCC)    Non-rheumatic mitral regurgitation    Neuropathy    Transient ischemic attack    Lung nodule    Cardiac pacemaker    Abdominal aortic aneurysm without rupture    Atherosclerosis of native coronary artery of native heart without angina pectoris    Type 2 diabetes mellitus with nephropathy ([address]    Peripheral vascular disease (HCC)    Biventricular cardiac pacemaker in situ    Ischemic cardiomyopathy    Chronic kidney disease (CKD), stage II (mild)    Long term current use of anticoagulant    Status post biventricular pacemaker    Mixed hyperlipidemia    Dysuria    Personal history of tobacco use    Lumbar stenosis with neurogenic claudication    Encounter for pacemaker at end of battery life    Thrombocytopenia ([address]    Adenocarcinoma of left lung ([address]    Chronic renal disease, stage III ([address] [161109] Past Surgical History:   Procedure Laterality Date    ABDOMINAL AORTIC ANEURYSM REPAIR      EVAR- currently has leakage. attempt to repair in 2016    CATARACT REMOVAL Bilateral     COLONOSCOPY      CORONARY ARTERY BYPASS GRAFT  2006    x 6 vessels    LUMBAR LAMINECTOMY  2019    right L4-5 lami for stenosis and CSF leak repair    PACEMAKER  02/15/2012    PACEMAKER PLACEMENT  2005    DE UNLISTED PROCEDURE CARDIAC SURGERY      pacemaker placed       Social History     Socioeconomic History    Marital status:      Spouse name: Not on file    Number of children: Not on file    Years of education: Not on file    Highest education level: Not on file   Occupational History    Not on file   Tobacco Use    Smoking status: Former     Packs/day: 1.00     Years: 30.00     Pack years: 30.00     Types: Cigarettes     Quit date: 2007     Years since quittin.0    Smokeless tobacco: Never   Substance and Sexual Activity    Alcohol use: Yes     Alcohol/week: 24.0 standard drinks    Drug use: No    Sexual activity: Not on file   Other Topics Concern    Not on file   Social History Narrative     and lives with wife. He is originally from [address] [place] [address], but has lived here since . 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 1 dog.      Social Determinants of Health     Financial Resource Strain: Not on file   Food Insecurity: Not on file   Transportation Needs: Not on file   Physical Activity: Not on file   Stress: Not on file   Social Connections: Not on file   Intimate Partner Violence: Not on file   Housing Stability: Not on file       Family History   Problem Relation Age of Onset    Pneumonia Father     Asthma Father     Heart Disease Mother     Diabetes Mother     Gall Bladder Disease Mother     Cancer Maternal Grandmother     Diabetes Brother        No Known Allergies        Review of Systems    OBJECTIVE:  Physical Exam:  Vitals:    23 1612   BP: (!) 155/83   Pulse: 76   Resp: 14   Temp: 98 °F (36.7 °C)   SpO2: 94%        GENERAL APPEARANCE:   The patient is normal weight and in no respiratory distress. HEENT:   PERRL. Conjunctivae unremarkable. Nasal mucosa is without epistaxis, exudate, or polyps. Gums and dentition are unremarkable. There is no oropharyngeal narrowing. TMs are clear. NECK/LYMPHATIC:   Symmetrical with no elevation of jugular venous pulsation. Trachea midline. No thyroid enlargement. No cervical adenopathy. LUNGS:   Normal respiratory effort with symmetrical lung expansion. Breath sounds clear. HEART:   There is a regular rate and rhythm. No murmur, rub, or gallop. There is no edema in the lower extremities. ABDOMEN:   Soft and non-tender. No hepatosplenomegaly. Bowel sounds are normal.       NEURO:   The patient is alert and oriented to person, place, and time. Memory appears intact and mood is normal.  No gross sensorimotor deficits are present. Current Outpatient Medications   Medication Instructions    albuterol sulfate  (90 Base) MCG/ACT inhaler 2 puffs, Inhalation, EVERY 6 HOURS PRN    apixaban (ELIQUIS) 5 mg, Oral, 2 TIMES DAILY    carvedilol (COREG) 25 MG tablet TAKE 1 TABLET BY MOUTH TWICE DAILY    dilTIAZem (TIAZAC) 240 mg, Oral, DAILY    fluticasone-umeclidin-vilant (TRELEGY ELLIPTA) 100-62.5-25 MCG/INH AEPB 1 puff, Inhalation, DAILY    glipiZIDE (GLUCOTROL) 2.5 mg, Oral, 2 TIMES DAILY    hydroCHLOROthiazide (HYDRODIURIL) 25 mg, Oral, DAILY    lisinopril (PRINIVIL;ZESTRIL) 30 MG tablet TAKE 1 TABLET BY MOUTH DAILY    lovastatin (MEVACOR) 20 mg, Oral, NIGHTLY, TAKE 1 TABLET BY MOUTH DAILY    metFORMIN (GLUCOPHAGE-XR) 1,000 mg, Oral, DAILY WITH DINNER    traMADol-acetaminophen (ULTRACET) 37.5-325 MG per tablet 2 tablets, Oral, EVERY 8 HOURS PRN         DIAGNOSTIC TESTS:    CXR:    No results found for this or any previous visit from the past 365 days.       CT WITHOUT CONTRAST:    CT CHEST WO CONTRAST 12/27/2022    Narrative  CT CHEST without INTRAVENOUS CONTRAST. INDICATION: Pulmonary nodule(s) follow up. History of bronchoalveolar cell  carcinoma. COMPARISON: June 2022 and March 2022 and December 2021    TECHNIQUE:  1mm and 5 mm axial scans from the apices through the diaphragms  without contrast. Radiation dose reduction techniques were used for this study. Our CT scanners use one or more of the following:  Automated exposure control,  adjustment of the mA and or kV according to patient size, iterative  reconstruction. FINDINGS:  LUNGS:  The 3-4 cm groundglass opacity left upper lobe appears stable. No new pulmonary nodules or airspace disease. No airspace disease. AIRWAYS: Trachea and proximal bronchi grossly patent. PLEURA: Small amount of pleural thickening right base. LYMPH NODES: No enlarged axillary, hilar or mediastinal lymph nodes. HEART: Normal size. Cardiac pacemaker is present on the left. CORONARIES: Prior CABG surgery. UPPER ABDOMEN: Normal size adrenal glands. Calcified gallstones. Right renal  cyst  SKELETAL/CHEST WALL: DJD, otherwise no gross bony lesions. Impression  1) Stable 3-4 cm groundglass opacity left upper lobe. No new lung abnormalities. 2) Additional findings include: Dense coronary atherosclerosis, Prior CABG  surgery changes, Multiple gallstones, Right renal cyst.      CT WITH CONTRAST:    No results found for this or any previous visit from the past 365 days. CT HIGH RES:    No results found for this or any previous visit from the past 365 days. CT PE PROTOCOL:    No results found for this or any previous visit from the past 365 days. LDCT SCREENING:    No results found for this or any previous visit from the past 365 days. PET SCAN:    No results found for this or any previous visit from the past 365 days. Spirometry:      No flowsheet data found.     Exercise oximetry:          ASSESSMENT: Diagnosis Orders   1. Chronic obstructive pulmonary disease, unspecified COPD type ([facility]        2. Bronchoalveolar carcinoma, left ([facility]        3. Malignant neoplasm of lower lobe, right bronchus or lung ([facility]            PLAN:  Will review CT scans with chest conference, so far alveolar cell cancer has been stable  Continue Trelegy and albuterol  Follow-up in 6 months with repeat CT    No orders of the defined types were placed in this encounter. No orders of the defined types were placed in this encounter.         Electronically signed by  Negro Green MD

## 2023-01-25 ENCOUNTER — OFFICE VISIT (OUTPATIENT)
Dept: CARDIOLOGY CLINIC | Age: 83
End: 2023-01-25
Payer: MEDICARE

## 2023-01-25 VITALS
WEIGHT: 187.8 LBS | HEIGHT: 71 IN | BODY MASS INDEX: 26.29 KG/M2 | HEART RATE: 66 BPM | SYSTOLIC BLOOD PRESSURE: 128 MMHG | DIASTOLIC BLOOD PRESSURE: 78 MMHG

## 2023-01-25 DIAGNOSIS — Z79.01 ANTICOAGULANT LONG-TERM USE: ICD-10-CM

## 2023-01-25 DIAGNOSIS — I48.21 PERMANENT ATRIAL FIBRILLATION (HCC): Primary | ICD-10-CM

## 2023-01-25 PROCEDURE — G8484 FLU IMMUNIZE NO ADMIN: HCPCS | Performed by: INTERNAL MEDICINE

## 2023-01-25 PROCEDURE — 3074F SYST BP LT 130 MM HG: CPT | Performed by: INTERNAL MEDICINE

## 2023-01-25 PROCEDURE — G8427 DOCREV CUR MEDS BY ELIG CLIN: HCPCS | Performed by: INTERNAL MEDICINE

## 2023-01-25 PROCEDURE — 99214 OFFICE O/P EST MOD 30 MIN: CPT | Performed by: INTERNAL MEDICINE

## 2023-01-25 PROCEDURE — 3078F DIAST BP <80 MM HG: CPT | Performed by: INTERNAL MEDICINE

## 2023-01-25 PROCEDURE — G8417 CALC BMI ABV UP PARAM F/U: HCPCS | Performed by: INTERNAL MEDICINE

## 2023-01-25 PROCEDURE — 1036F TOBACCO NON-USER: CPT | Performed by: INTERNAL MEDICINE

## 2023-01-25 PROCEDURE — 1123F ACP DISCUSS/DSCN MKR DOCD: CPT | Performed by: INTERNAL MEDICINE

## 2023-01-25 ASSESSMENT — ENCOUNTER SYMPTOMS
VOMITING: 0
BLURRED VISION: 0
HEMOPTYSIS: 0
BLOATING: 0
ABDOMINAL PAIN: 0
DOUBLE VISION: 0
SHORTNESS OF BREATH: 0
COUGH: 0
NAUSEA: 0
ORTHOPNEA: 0
BACK PAIN: 0

## 2023-01-25 NOTE — PROGRESS NOTES
[facility] CARDIOLOGY  [address] [facility], [address]  PHONE: [phone]    23    NAME:  Chandler Smyth  : 1940  MRN: [id number]         SUBJECTIVE:   Chandler Smyth is a 78 y.o. male seen for a visit regarding the following:     Chief Complaint   Patient presents with    Atrial Fibrillation    Results     echo       HPI:  (prior [facility] pt)    History of coronary disease with prior CABG [no records available; at [facility]]. Other history of AAA status post EVAR; appears underwent endovascular treatment of type II endoleak from 2016. Also stated permanent atrial fibrillation with prior CRT-P placement (underwent generator exchange from 2022; ~92% paced). Last on echocardiogram from 2022 with ejection fraction stated at 45-50%. Prior noted negative Cardiolite from 2017; EF stated 57%. Also DM, HTN, HLP. Echo from 2022 with preserved EF and basal inferior/inferolateral wall motion abnormality; severe left atrial enlargement with moderate MR, RVSP at 53mmHg. States mostly impeded with FRANCISCO with COPD. Stable symptoms. Denies any chest discomfort. No PND/orthopnea. No significant weight gain. Can walk for about a block on flat ground with his cane; some balance issues. Well-controlled home BPs. Tolerating current medications. Coronary disease-stable, atrial fibrillation-controlled, cardiomyopathy-controlled    Past Medical History, Past Surgical History, Family history, Social History, and Medications were all reviewed with the patient today and updated as necessary.      No Known Allergies  Patient Active Problem List   Diagnosis    Lumbar radiculopathy    HNP (herniated nucleus pulposus), lumbar    Essential hypertension with goal blood pressure less than 130/85    Atrial flutter, chronic (HCC)    Hx of CABG    Atrial fibrillation (HCC)    Chronic obstructive pulmonary disease (HCC)    S/P AAA (abdominal aortic aneurysm) repair    Diabetes ([address]    Prostatitis    Acquired deformity of nose    SOB (shortness of breath)    Hyperlipidemia    Prostate cancer ([address]    Non-rheumatic mitral regurgitation    Neuropathy    Transient ischemic attack    Lung nodule    Cardiac pacemaker    Abdominal aortic aneurysm without rupture    Atherosclerosis of native coronary artery of native heart without angina pectoris    Type 2 diabetes mellitus with nephropathy ([address]    Peripheral vascular disease (HCC)    Biventricular cardiac pacemaker in situ    Ischemic cardiomyopathy    Chronic kidney disease (CKD), stage II (mild)    Long term current use of anticoagulant    Status post biventricular pacemaker    Mixed hyperlipidemia    Dysuria    Personal history of tobacco use    Lumbar stenosis with neurogenic claudication    Encounter for pacemaker at end of battery life    Thrombocytopenia ([address]    Adenocarcinoma of left lung ([address]    Chronic renal disease, stage III ([address] [076531]     Past Medical History:   Diagnosis Date    AAA (abdominal aortic aneurysm)     leakage from stent repaired     Ambulates with cane     single cane    Anticoagulated on Coumadin     pt instructed to hold x 5 days     Aortic regurgitation 08/09/2016    mild to moderate per echo     Arthritis     Asthma     Atrial fibrillation ([address]     permanent- Pacemaker    Cardiac pacemaker 07/20/2016    medtronic - copy of card on chart- pacer interr 7.25.18-negative for pacer dependent    Chronic kidney disease (CKD), stage II (mild)     pt denies    Chronic obstructive pulmonary disease (HCC)     inhalers    Coronary atherosclerosis of native coronary vessel     Followed by [facility]    Essential hypertension     managed well with meds    Former smoker     35 yrs, 1 pk per day.  quit 2006    Hearing impairment     hearing aids to hemant ears    History of AAA (abdominal aortic aneurysm) repair 2014    EVAR    History of prostate cancer 2003    treated with radiation    History of TIA (transient ischemic attack)     no residual    [place] (hard of hearing)     hemant hearing aides    Hx of CABG     6 vessel    Hyperlipidemia     Ischemic cardiomyopathy     Lower back pain     Mitral regurgitation 2016    moderate per echo     Skin abnormalities     Type 2 diabetes mellitus ([address]     oral agent/AVG BS: 120/no s.s of hypoglycemia/Last A1c:6.0    Vision abnormalities      Past Surgical History:   Procedure Laterality Date    ABDOMINAL AORTIC ANEURYSM REPAIR      EVAR- currently has leakage. attempt to repair in 2016    CATARACT REMOVAL Bilateral     COLONOSCOPY      CORONARY ARTERY BYPASS GRAFT  2006    x 6 vessels    LUMBAR LAMINECTOMY  2019    right L4-5 lami for stenosis and CSF leak repair    PACEMAKER  02/15/2012    PACEMAKER PLACEMENT      IA UNLISTED PROCEDURE CARDIAC SURGERY      pacemaker placed     Family History   Problem Relation Age of Onset    Pneumonia Father     Asthma Father     Heart Disease Mother     Diabetes Mother     Gall Bladder Disease Mother     Cancer Maternal Grandmother     Diabetes Brother      Social History     Tobacco Use    Smoking status: Former     Packs/day: 1.00     Years: 30.00     Pack years: 30.00     Types: Cigarettes     Quit date: 2007     Years since quittin.0    Smokeless tobacco: Never   Substance Use Topics    Alcohol use:  Yes     Alcohol/week: 24.0 standard drinks     Current Outpatient Medications   Medication Sig Dispense Refill    fluticasone-umeclidin-vilant (TRELEGY ELLIPTA) 100-62.5-25 MCG/ACT AEPB inhaler Inhale 1 puff into the lungs daily 3 each 3    lovastatin (MEVACOR) 20 MG tablet Take 1 tablet by mouth nightly TAKE 1 TABLET BY MOUTH DAILY 90 tablet 4    metFORMIN (GLUCOPHAGE-XR) 500 mg extended release tablet Take 2 tablets by mouth Daily with supper 180 tablet 3    hydroCHLOROthiazide (HYDRODIURIL) 25 MG tablet Take 1 tablet by mouth daily 90 tablet 4    albuterol sulfate  (90 Base) MCG/ACT inhaler Inhale 2 puffs into the lungs every 6 hours as needed      apixaban (ELIQUIS) 5 MG TABS tablet Take 5 mg by mouth 2 times daily      carvedilol (COREG) 25 MG tablet TAKE 1 TABLET BY MOUTH TWICE DAILY      dilTIAZem (TIAZAC) 240 MG extended release capsule Take 240 mg by mouth daily      fluticasone-umeclidin-vilant (TRELEGY ELLIPTA) 100-62.5-25 MCG/INH AEPB Inhale 1 puff into the lungs daily      glipiZIDE (GLUCOTROL) 5 MG tablet Take 2.5 mg by mouth 2 times daily      lisinopril (PRINIVIL;ZESTRIL) 30 MG tablet TAKE 1 TABLET BY MOUTH DAILY       No current facility-administered medications for this visit. Review of Systems   Constitutional: Negative for chills, decreased appetite, fever, malaise/fatigue and weight gain. HENT:  Negative for nosebleeds. Eyes:  Negative for blurred vision and double vision. Cardiovascular:  Positive for dyspnea on exertion. Negative for chest pain, claudication, leg swelling, orthopnea, palpitations, paroxysmal nocturnal dyspnea and syncope. Respiratory:  Negative for cough, hemoptysis and shortness of breath. Endocrine: Negative for cold intolerance and heat intolerance. Hematologic/Lymphatic: Negative for bleeding problem. Skin:  Negative for rash. Musculoskeletal:  Negative for back pain, joint pain, muscle weakness and myalgias. Gastrointestinal:  Negative for bloating, abdominal pain, nausea and vomiting. Genitourinary:  Negative for dysuria. Neurological:  Negative for dizziness, light-headedness and weakness. Psychiatric/Behavioral:  Negative for altered mental status. PHYSICAL EXAM:    /78   Pulse 66   Ht 5' 11\" (1.803 m)   Wt 187 lb 12.8 oz (85.2 kg)   BMI 26.19 kg/m²      Physical Exam  Constitutional:       Appearance: Normal appearance. HENT:      Head: Normocephalic and atraumatic. Mouth/Throat:      Mouth: Mucous membranes are moist.   Eyes:      Pupils: Pupils are equal, round, and reactive to light.    Cardiovascular:      Rate and Rhythm: Normal rate and regular rhythm. Pulses: Normal pulses. Pulmonary:      Effort: Pulmonary effort is normal.      Breath sounds: Normal breath sounds. Abdominal:      General: Bowel sounds are normal. There is no distension. Palpations: Abdomen is soft. Tenderness: There is no abdominal tenderness. Musculoskeletal:         General: No swelling. Cervical back: Normal range of motion. Skin:     General: Skin is warm and dry. Neurological:      General: No focal deficit present. Mental Status: He is alert and oriented to person, place, and time. Medical problems and test results were reviewed with the patient today. No results found for this or any previous visit (from the past 672 hour(s)). Lab Results   Component Value Date/Time    CHOL 126 05/12/2022 01:39 PM    HDL 39 05/12/2022 01:39 PM    VLDL 31 05/12/2022 01:39 PM       No results found for any visits on 01/25/23. ASSESSMENT and PLAN    Andres Peraza was seen today for atrial fibrillation and results. Diagnoses and all orders for this visit:    Permanent atrial fibrillation ([address]    Anticoagulant long-term use      Overall Impression    Coronary disease-prior CABG. No records available. Continue aspirin/statin therapy. Cardiomyopathy-mild per echocardiogram from 1/2022. Repeat study from 12/2022 with preserved EF. Previous studies with preserved left ventricular function. No changes at this time. Exam euvolemic. Permanent atrial fibrillation-predominantly biventricular paced. Continue Eliquis. Long-term as of anticoagulation-risk/benefits/alternatives discussed with patient. Continue Eliquis 5 mg twice daily    AAA-prior EVAR. Hyperlipidemia-continue current moderate intensity statin. Last noted LDL at 56 from 5/12/2022    Other-last hemoglobin A1c noted at 6.1 from 5/12/2022    Return in about 6 months (around 7/25/2023).      Saumya Meyers MD  1/25/2023  10:11 AM

## 2023-02-08 ENCOUNTER — TELEPHONE (OUTPATIENT)
Dept: PULMONOLOGY | Age: 83
End: 2023-02-08

## 2023-02-08 DIAGNOSIS — R91.1 PULMONARY NODULE: ICD-10-CM

## 2023-02-08 DIAGNOSIS — D09.9 ADENOCARCINOMA IN SITU: Primary | ICD-10-CM

## 2023-02-16 ENCOUNTER — OFFICE VISIT (OUTPATIENT)
Dept: INTERNAL MEDICINE CLINIC | Facility: CLINIC | Age: 83
End: 2023-02-16

## 2023-02-16 VITALS
DIASTOLIC BLOOD PRESSURE: 62 MMHG | HEART RATE: 73 BPM | BODY MASS INDEX: 25.76 KG/M2 | RESPIRATION RATE: 15 BRPM | OXYGEN SATURATION: 99 % | WEIGHT: 184 LBS | HEIGHT: 71 IN | SYSTOLIC BLOOD PRESSURE: 120 MMHG

## 2023-02-16 DIAGNOSIS — E11.21 TYPE 2 DIABETES MELLITUS WITH NEPHROPATHY (HCC): ICD-10-CM

## 2023-02-16 DIAGNOSIS — E11.21 TYPE 2 DIABETES MELLITUS WITH NEPHROPATHY (HCC): Primary | ICD-10-CM

## 2023-02-16 DIAGNOSIS — I10 ESSENTIAL HYPERTENSION WITH GOAL BLOOD PRESSURE LESS THAN 130/85: ICD-10-CM

## 2023-02-16 DIAGNOSIS — D69.6 THROMBOCYTOPENIA (HCC): ICD-10-CM

## 2023-02-16 DIAGNOSIS — Z95.0 CARDIAC PACEMAKER: ICD-10-CM

## 2023-02-16 DIAGNOSIS — I48.21 PERMANENT ATRIAL FIBRILLATION (HCC): ICD-10-CM

## 2023-02-16 DIAGNOSIS — N18.31 STAGE 3A CHRONIC KIDNEY DISEASE (HCC): ICD-10-CM

## 2023-02-16 LAB
BASOPHILS # BLD: 0 K/UL (ref 0–0.2)
BASOPHILS NFR BLD: 0 % (ref 0–2)
DIFFERENTIAL METHOD BLD: ABNORMAL
EOSINOPHIL # BLD: 0.1 K/UL (ref 0–0.8)
EOSINOPHIL NFR BLD: 1 % (ref 0.5–7.8)
ERYTHROCYTE [DISTWIDTH] IN BLOOD BY AUTOMATED COUNT: 13.5 % (ref 11.9–14.6)
HCT VFR BLD AUTO: 33.8 % (ref 41.1–50.3)
HGB BLD-MCNC: 10.9 G/DL (ref 13.6–17.2)
IMM GRANULOCYTES # BLD AUTO: 0 K/UL (ref 0–0.5)
IMM GRANULOCYTES NFR BLD AUTO: 0 % (ref 0–5)
LYMPHOCYTES # BLD: 2 K/UL (ref 0.5–4.6)
LYMPHOCYTES NFR BLD: 37 % (ref 13–44)
MCH RBC QN AUTO: 32.2 PG (ref 26.1–32.9)
MCHC RBC AUTO-ENTMCNC: 32.2 G/DL (ref 31.4–35)
MCV RBC AUTO: 100 FL (ref 82–102)
MONOCYTES # BLD: 0.4 K/UL (ref 0.1–1.3)
MONOCYTES NFR BLD: 7 % (ref 4–12)
NEUTS SEG # BLD: 3 K/UL (ref 1.7–8.2)
NEUTS SEG NFR BLD: 55 % (ref 43–78)
NRBC # BLD: 0 K/UL (ref 0–0.2)
PLATELET # BLD AUTO: 119 K/UL (ref 150–450)
PMV BLD AUTO: 10.5 FL (ref 9.4–12.3)
RBC # BLD AUTO: 3.38 M/UL (ref 4.23–5.6)
WBC # BLD AUTO: 5.5 K/UL (ref 4.3–11.1)

## 2023-02-16 SDOH — ECONOMIC STABILITY: FOOD INSECURITY: WITHIN THE PAST 12 MONTHS, YOU WORRIED THAT YOUR FOOD WOULD RUN OUT BEFORE YOU GOT MONEY TO BUY MORE.: NEVER TRUE

## 2023-02-16 SDOH — ECONOMIC STABILITY: HOUSING INSECURITY
IN THE LAST 12 MONTHS, WAS THERE A TIME WHEN YOU DID NOT HAVE A STEADY PLACE TO SLEEP OR SLEPT IN A SHELTER (INCLUDING NOW)?: NO

## 2023-02-16 SDOH — ECONOMIC STABILITY: INCOME INSECURITY: HOW HARD IS IT FOR YOU TO PAY FOR THE VERY BASICS LIKE FOOD, HOUSING, MEDICAL CARE, AND HEATING?: NOT VERY HARD

## 2023-02-16 SDOH — ECONOMIC STABILITY: FOOD INSECURITY: WITHIN THE PAST 12 MONTHS, THE FOOD YOU BOUGHT JUST DIDN'T LAST AND YOU DIDN'T HAVE MONEY TO GET MORE.: NEVER TRUE

## 2023-02-16 ASSESSMENT — ENCOUNTER SYMPTOMS
COUGH: 0
BLOOD IN STOOL: 0
SHORTNESS OF BREATH: 0

## 2023-02-16 ASSESSMENT — PATIENT HEALTH QUESTIONNAIRE - PHQ9
DEPRESSION UNABLE TO ASSESS: PT REFUSES
SUM OF ALL RESPONSES TO PHQ QUESTIONS 1-9: 0
SUM OF ALL RESPONSES TO PHQ9 QUESTIONS 1 & 2: 0
SUM OF ALL RESPONSES TO PHQ QUESTIONS 1-9: 0
SUM OF ALL RESPONSES TO PHQ QUESTIONS 1-9: 0
2. FEELING DOWN, DEPRESSED OR HOPELESS: 0
SUM OF ALL RESPONSES TO PHQ QUESTIONS 1-9: 0
1. LITTLE INTEREST OR PLEASURE IN DOING THINGS: 0

## 2023-02-16 NOTE — PROGRESS NOTES
SUBJECTIVE:   Chandler Smyth is a 78 y.o. male seen for a visit regarding   Chief Complaint   Patient presents with    3 Month Follow-Up        Diabetes  He presents for his follow-up diabetic visit. He has type 2 diabetes mellitus. His disease course has been stable. There are no hypoglycemic associated symptoms. Pertinent negatives for diabetes include no chest pain. (Alb 262 -5/2022-had been 400 plus range) Home blood sugar record trend: checks every month or 2 -says normal-on 2 metformin-checked daily long-A1C 6.1 done 5/2022; was 5.9 befor. Hyperlipidemia  This is a chronic problem. The current episode started more than 1 year ago. The problem is controlled. Pertinent negatives include no chest pain, myalgias or shortness of breath. Current antihyperlipidemic treatment includes statins. The current treatment provides moderate (LDL 56 , HDL 39 done 5/2022) improvement of lipids. Past Medical History, Past Surgical History, Family history, Social History, and Medications were all reviewed with the patient today and updated as necessary.        Current Outpatient Medications   Medication Sig Dispense Refill    fluticasone-umeclidin-vilant (TRELEGY ELLIPTA) 100-62.5-25 MCG/ACT AEPB inhaler Inhale 1 puff into the lungs daily 3 each 3    lovastatin (MEVACOR) 20 MG tablet Take 1 tablet by mouth nightly TAKE 1 TABLET BY MOUTH DAILY 90 tablet 4    metFORMIN (GLUCOPHAGE-XR) 500 mg extended release tablet Take 2 tablets by mouth Daily with supper 180 tablet 3    hydroCHLOROthiazide (HYDRODIURIL) 25 MG tablet Take 1 tablet by mouth daily 90 tablet 4    albuterol sulfate  (90 Base) MCG/ACT inhaler Inhale 2 puffs into the lungs every 6 hours as needed      apixaban (ELIQUIS) 5 MG TABS tablet Take 5 mg by mouth 2 times daily      carvedilol (COREG) 25 MG tablet TAKE 1 TABLET BY MOUTH TWICE DAILY      dilTIAZem (TIAZAC) 240 MG extended release capsule Take 240 mg by mouth daily      glipiZIDE (GLUCOTROL) 5 MG tablet Take 2.5 mg by mouth 2 times daily      lisinopril (PRINIVIL;ZESTRIL) 30 MG tablet TAKE 1 TABLET BY MOUTH DAILY       No current facility-administered medications for this visit. No Known Allergies  Patient Active Problem List   Diagnosis    Lumbar radiculopathy    HNP (herniated nucleus pulposus), lumbar    Essential hypertension with goal blood pressure less than 130/85    Atrial flutter, chronic (HCC)    Hx of CABG    Atrial fibrillation (HCC)    Chronic obstructive pulmonary disease (HCC)    S/P AAA (abdominal aortic aneurysm) repair    Diabetes (HCC)    Prostatitis    Acquired deformity of nose    SOB (shortness of breath)    Hyperlipidemia    Prostate cancer (HCC)    Non-rheumatic mitral regurgitation    Neuropathy    Transient ischemic attack    Lung nodule    Cardiac pacemaker    Abdominal aortic aneurysm without rupture    Atherosclerosis of native coronary artery of native heart without angina pectoris    Type 2 diabetes mellitus with nephropathy (HCC)    Peripheral vascular disease (HCC)    Biventricular cardiac pacemaker in situ    Ischemic cardiomyopathy    Chronic kidney disease (CKD), stage II (mild)    Long term current use of anticoagulant    Status post biventricular pacemaker    Mixed hyperlipidemia    Dysuria    Personal history of tobacco use    Lumbar stenosis with neurogenic claudication    Encounter for pacemaker at end of battery life    Thrombocytopenia ([facility]    Adenocarcinoma of left lung ([facility]    Chronic renal disease, stage III ([facility] [[id number]]     Social History     Tobacco Use    Smoking status: Former     Packs/day: 1.00     Years: 30.00     Pack years: 30.00     Types: Cigarettes     Quit date: 2007     Years since quittin.1    Smokeless tobacco: Never   Substance Use Topics    Alcohol use: Yes     Alcohol/week: 24.0 standard drinks          Review of Systems   Constitutional:  Negative for unexpected weight change.    Respiratory:  Negative for cough and shortness of breath. Walks 1/2 block for mild SOB-on Trelegy-sees [place]   Cardiovascular:  Negative for chest pain. Echo 2022 45-50% EF ; has pacer -hx a fib followed [facility]-improved to 55-60% 12/2022   Gastrointestinal:  Negative for blood in stool. Genitourinary:         GFR been around 50-had RT for prostate ca about 15 yr ago--psa 0.9 5/2022   Musculoskeletal:  Negative for myalgias. Hematological:  Does not bruise/bleed easily. Hx low platelets       OBJECTIVE:  /62   Pulse 73   Resp 15   Ht 5' 11\" (1.803 m)   Wt 184 lb (83.5 kg)   SpO2 99%   BMI 25.66 kg/m²      Physical Exam  Cardiovascular:      Rate and Rhythm: Normal rate and regular rhythm. Heart sounds: No murmur heard. Pulmonary:      Breath sounds: Rales (few in bases) present. No wheezing. Musculoskeletal:      Right lower leg: No edema. Left lower leg: No edema. Medical problems and test results were reviewed with the patient today. ASSESSMENT and PLAN     1. Type 2 diabetes mellitus with nephropathy (HCC)  -     CBC with Auto Differential; Future  -     Basic Metabolic Panel; Future  -     Hemoglobin A1C; Future  2. Permanent atrial fibrillation (HCC)  3. Cardiac pacemaker  4. Essential hypertension with goal blood pressure less than 130/85  5. Thrombocytopenia (HCC)  6. Stage 3a chronic kidney disease ([address]   Back better fortunately; BP good; heart stable ; no bleeding on eliquis; renal stable -last A1C good -2 metformin is max ; recheck plts too  lab results and schedule for future lab studies reviewed with patient  reviewed medications and side effects in detail     No follow-ups on file.

## 2023-02-17 LAB
ANION GAP SERPL CALC-SCNC: 6 MMOL/L (ref 2–11)
BUN SERPL-MCNC: 44 MG/DL (ref 8–23)
CALCIUM SERPL-MCNC: 8.9 MG/DL (ref 8.3–10.4)
CHLORIDE SERPL-SCNC: 108 MMOL/L (ref 101–110)
CO2 SERPL-SCNC: 27 MMOL/L (ref 21–32)
CREAT SERPL-MCNC: 1.8 MG/DL (ref 0.8–1.5)
EST. AVERAGE GLUCOSE BLD GHB EST-MCNC: 123 MG/DL
GLUCOSE SERPL-MCNC: 130 MG/DL (ref 65–100)
HBA1C MFR BLD: 5.9 % (ref 4.8–5.6)
POTASSIUM SERPL-SCNC: 4 MMOL/L (ref 3.5–5.1)
SODIUM SERPL-SCNC: 141 MMOL/L (ref 133–143)

## 2023-03-01 NOTE — TELEPHONE ENCOUNTER
I have left patient a message for a return call.
I have spoken with patient. He is aware of thoracic conference lead by Dr Terry Cabello recommendation for 6 month follow up CT chest and appointment with Dr Terry Cabello. He allows me to schedule for him for both CT chest and follow up appointment in June 2023.
Left a message for a return call. When he calls back, please ask patient to schedule follow up CT chest 6/27/2023 or after. Once scheduled, he needs follow up office appointment after repeat image.
Patient discussed at thoracic conference by Dr Heidi Garcia. Group discusses JUANCHO ground glass biopsy proven     \"LEFT UPPER LOBE BIOPSY\":  FOCAL ADENOCARCINOMA IN SITU   (BRONCHIOALVEOLAR CARCINOMA). sms/1/7/2022     Serial imaging reviewed and per group not significantly changed on 12/23 CT chest perhaps now more solid component. Prior DLCO 39% 1/2022 discussed with Dr Heidi Garcia. Dr Heidi Garcia orders follow up CT chest in 6 months and office appointment. CT chest ordered but not yet scheduled. Per MD, if alveolar cell cancer grows at 6 month period patient should be referred to radiation oncology for discussion of SBRT. With approval of Dr Heidi Garcia,  have attempted to call patient for further discussion and had to leave a message for a return call.
Patient is returning your call.  Ask for you please try again
5

## 2023-03-03 DIAGNOSIS — I10 ESSENTIAL HYPERTENSION WITH GOAL BLOOD PRESSURE LESS THAN 130/85: Primary | ICD-10-CM

## 2023-03-06 RX ORDER — CARVEDILOL 25 MG/1
TABLET ORAL
Qty: 180 TABLET | Refills: 3 | Status: SHIPPED | OUTPATIENT
Start: 2023-03-06

## 2023-03-17 ENCOUNTER — HOSPITAL ENCOUNTER (OUTPATIENT)
Dept: CT IMAGING | Age: 83
Discharge: HOME | End: 2023-03-20

## 2023-03-17 ENCOUNTER — OFFICE VISIT (OUTPATIENT)
Dept: INTERNAL MEDICINE CLINIC | Facility: CLINIC | Age: 83
End: 2023-03-17

## 2023-03-17 ENCOUNTER — HOSPITAL ENCOUNTER (OUTPATIENT)
Dept: GENERAL RADIOLOGY | Age: 83
Discharge: HOME | End: 2023-03-20

## 2023-03-17 VITALS
DIASTOLIC BLOOD PRESSURE: 78 MMHG | HEIGHT: 71 IN | BODY MASS INDEX: 25.2 KG/M2 | WEIGHT: 180 LBS | SYSTOLIC BLOOD PRESSURE: 128 MMHG

## 2023-03-17 DIAGNOSIS — L02.619 CELLULITIS AND ABSCESS OF FOOT: Primary | ICD-10-CM

## 2023-03-17 DIAGNOSIS — L02.619 CELLULITIS AND ABSCESS OF FOOT: ICD-10-CM

## 2023-03-17 DIAGNOSIS — L03.119 CELLULITIS AND ABSCESS OF FOOT: ICD-10-CM

## 2023-03-17 DIAGNOSIS — L03.119 CELLULITIS AND ABSCESS OF FOOT: Primary | ICD-10-CM

## 2023-03-17 RX ORDER — DOXYCYCLINE HYCLATE 100 MG/1
100 CAPSULE ORAL 2 TIMES DAILY
Qty: 14 CAPSULE | Refills: 0 | Status: SHIPPED | OUTPATIENT
Start: 2023-03-17 | End: 2023-03-24

## 2023-03-17 RX ORDER — CEPHALEXIN 500 MG/1
500 CAPSULE ORAL 3 TIMES DAILY
Qty: 21 CAPSULE | Refills: 0 | Status: SHIPPED | OUTPATIENT
Start: 2023-03-17 | End: 2023-03-24

## 2023-03-17 ASSESSMENT — PATIENT HEALTH QUESTIONNAIRE - PHQ9
SUM OF ALL RESPONSES TO PHQ QUESTIONS 1-9: 0
SUM OF ALL RESPONSES TO PHQ QUESTIONS 1-9: 0
SUM OF ALL RESPONSES TO PHQ9 QUESTIONS 1 & 2: 0
2. FEELING DOWN, DEPRESSED OR HOPELESS: 0
SUM OF ALL RESPONSES TO PHQ QUESTIONS 1-9: 0
SUM OF ALL RESPONSES TO PHQ QUESTIONS 1-9: 0
1. LITTLE INTEREST OR PLEASURE IN DOING THINGS: 0

## 2023-03-17 ASSESSMENT — ENCOUNTER SYMPTOMS: COLOR CHANGE: 1

## 2023-03-17 NOTE — PROGRESS NOTES
SUBJECTIVE:   Fernanda Abbott is a 78 y.o. male seen for a visit regarding   Chief Complaint   Patient presents with    Follow-Up from Hospital     Pt here for ER f/u on 3/14/23 due to right foot swelling . Pt's right foot red,swollen and has a larger then a quarter size mass to top of right foot. Pt said he just noticed the mass this morning. Pt stated the swelling started 2 weeks         Other  This is a new problem. The current episode started 1 to 4 weeks ago (right foot red and swelling 3 week ago--to ER in [place] 3/7-felt cellulitis -took doxycycline 100mg bid and Keflex 500mg bid together x 7d; redness was minimally better -today is worse). Progression since onset: cr 1.6 -GFR 42 few days ago--wbc normal. Pertinent negatives include no fever. Past Medical History, Past Surgical History, Family history, Social History, and Medications were all reviewed with the patient today and updated as necessary.        Current Outpatient Medications   Medication Sig Dispense Refill    cephALEXin (KEFLEX) 500 MG capsule Take 1 capsule by mouth 3 times daily for 7 days 21 capsule 0    doxycycline hyclate (VIBRAMYCIN) 100 MG capsule Take 1 capsule by mouth 2 times daily for 7 days 14 capsule 0    carvedilol (COREG) 25 MG tablet TAKE 1 TABLET BY MOUTH  TWICE DAILY (Patient taking differently: Take 25 mg by mouth 2 times daily (with meals)) 180 tablet 3    fluticasone-umeclidin-vilant (TRELEGY ELLIPTA) 100-62.5-25 MCG/ACT AEPB inhaler Inhale 1 puff into the lungs daily 3 each 3    lovastatin (MEVACOR) 20 MG tablet Take 1 tablet by mouth nightly TAKE 1 TABLET BY MOUTH DAILY (Patient taking differently: Take 20 mg by mouth nightly) 90 tablet 4    metFORMIN (GLUCOPHAGE-XR) 500 mg extended release tablet Take 2 tablets by mouth Daily with supper 180 tablet 3    hydroCHLOROthiazide (HYDRODIURIL) 25 MG tablet Take 1 tablet by mouth daily 90 tablet 4    albuterol sulfate  (90 Base) MCG/ACT inhaler Inhale 2 puffs into the lungs every 6 hours as needed      apixaban (ELIQUIS) 5 MG TABS tablet Take 5 mg by mouth 2 times daily      dilTIAZem (TIAZAC) 240 MG extended release capsule Take 240 mg by mouth daily      glipiZIDE (GLUCOTROL) 5 MG tablet Take 2.5 mg by mouth 2 times daily      lisinopril (PRINIVIL;ZESTRIL) 30 MG tablet Take 30 mg by mouth daily       No current facility-administered medications for this visit.      No Known Allergies  Patient Active Problem List   Diagnosis    Lumbar radiculopathy    HNP (herniated nucleus pulposus), lumbar    Essential hypertension with goal blood pressure less than 130/85    Atrial flutter, chronic (HCC)    Hx of CABG    Atrial fibrillation (HCC)    Chronic obstructive pulmonary disease (HCC)    S/P AAA (abdominal aortic aneurysm) repair    Diabetes (HCC)    Prostatitis    Acquired deformity of nose    SOB (shortness of breath)    Hyperlipidemia    Prostate cancer (HCC)    Non-rheumatic mitral regurgitation    Neuropathy    Transient ischemic attack    Lung nodule    Cardiac pacemaker    Abdominal aortic aneurysm without rupture    Atherosclerosis of native coronary artery of native heart without angina pectoris    Type 2 diabetes mellitus with nephropathy (HCC)    Peripheral vascular disease (HCC)    Biventricular cardiac pacemaker in situ    Ischemic cardiomyopathy    Chronic kidney disease (CKD), stage II (mild)    Long term current use of anticoagulant    Status post biventricular pacemaker    Mixed hyperlipidemia    Dysuria    Personal history of tobacco use    Lumbar stenosis with neurogenic claudication    Encounter for pacemaker at end of battery life    Thrombocytopenia ([facility]    Adenocarcinoma of left lung ([facility]    Chronic renal disease, stage III ([facility] [[id number]]     Social History     Tobacco Use    Smoking status: Former     Packs/day: 1.00     Years: 30.00     Pack years: 30.00     Types: Cigarettes     Quit date: 2007     Years since quittin.2    Smokeless tobacco: Never   Substance Use Topics    Alcohol use: Yes     Alcohol/week: 24.0 standard drinks          Review of Systems   Constitutional:  Negative for fever. Musculoskeletal:         Had mass dorsum of right foot-getting bigger   Skin:  Positive for color change. OBJECTIVE:  /78   Ht 5' 11\" (1.803 m)   Wt 180 lb (81.6 kg)   BMI 25.10 kg/m²      Physical Exam  Skin:     Findings: Erythema (redness and warmth over dorsal foot-base of toes almost to ankle - 4 cm swelling mid dorsal medial area-not tender-medium firmness- some mild edema in foot) present. Medical problems and test results were reviewed with the patient today. ASSESSMENT and PLAN     1. Cellulitis and abscess of foot  -     cephALEXin (KEFLEX) 500 MG capsule; Take 1 capsule by mouth 3 times daily for 7 days, Disp-21 capsule, R-0Normal  -     doxycycline hyclate (VIBRAMYCIN) 100 MG capsule; Take 1 capsule by mouth 2 times daily for 7 days, Disp-14 capsule, R-0Normal  -     XR FOOT RIGHT (2 VIEWS); Future  -     [address] Max Womack MD, Orthopedic Surgery, [facility]; Future   X ray bone ok; CT had soft tissue edema-will resume 2 antibiotics; see foot ortho also-would have expected more improvement than described after the first 7 d course  reviewed medications and side effects in detail   radiology results and schedule of future radiology studies reviewed with patient     Return if symptoms worsen or fail to improve.

## 2023-03-20 DIAGNOSIS — L02.619 CELLULITIS AND ABSCESS OF FOOT: ICD-10-CM

## 2023-03-20 DIAGNOSIS — L03.119 CELLULITIS AND ABSCESS OF FOOT: ICD-10-CM

## 2023-03-20 RX ORDER — DOXYCYCLINE HYCLATE 100 MG/1
100 CAPSULE ORAL 2 TIMES DAILY
Qty: 14 CAPSULE | Refills: 0 | Status: SHIPPED | OUTPATIENT
Start: 2023-03-20 | End: 2023-03-27

## 2023-03-20 RX ORDER — CEPHALEXIN 500 MG/1
500 CAPSULE ORAL 3 TIMES DAILY
Qty: 21 CAPSULE | Refills: 0 | Status: SHIPPED | OUTPATIENT
Start: 2023-03-20 | End: 2023-03-27

## 2023-03-20 NOTE — TELEPHONE ENCOUNTER
Pt medications cephALEXin (KEFLEX) 500 MG capsule and doxycycline hyclate (VIBRAMYCIN) 100 MG capsule [facility]. However pt said he had wanted them sent to a local pharmacy at [place] in [facility]. Pt is asking if we can Please correct.

## 2023-03-22 ENCOUNTER — OFFICE VISIT (OUTPATIENT)
Dept: ORTHOPEDIC SURGERY | Age: 83
End: 2023-03-22

## 2023-03-22 DIAGNOSIS — E11.610 CHARCOT FOOT DUE TO DIABETES MELLITUS (HCC): Primary | ICD-10-CM

## 2023-03-22 RX ORDER — METFORMIN HYDROCHLORIDE 500 MG/1
1000 TABLET, EXTENDED RELEASE ORAL
Qty: 180 TABLET | Refills: 3 | Status: SHIPPED | OUTPATIENT
Start: 2023-03-22

## 2023-03-22 NOTE — PROGRESS NOTES
The patient was prescribed a walker boot for the patient's right foot. The patient wears a size 11.5 shoe and I fitted them with a L size boot. The patient was fitted and instructed on the use of prescribed walker boot. I explained how to fit themselves and that the plastic flexible piece should always be on the front of the boot and secured by the Velcro straps on top. The air bladder in the boot was adjusted according to proper fit and comfort. The patient walked a short distance and acknowledged satisfaction with current fit. I also explained that they need a heel lift or a higher heeled shoe for the uninvolved LE to help normalize gait and avoid excessive low back stress/strain due to leg length inequality created from walker boot. Patient read and signed documenting they understand and agree to [facility]'s current DME return policy.

## 2023-03-22 NOTE — PROGRESS NOTES
Name: Dina Tucker  YOB: 1940  Gender: male  MRN: [id number]    Summary:   Right foot probable Charcot arthropathy       CC: Foot Pain (Right foot pain and swelling)       HPI: Dina Tucker is a 78 y.o. male who presents with Foot Pain (Right foot pain and swelling)  . This patient presents to the office today as referral from his primary care doctor. I reviewed his outside medical records. He had a CT scan, an x-ray, 2 ER visits, and ultrasound which was negative for DVT and has been on oral antibiotics with minimal change in the redness in his foot. He denies any fevers chills nausea or vomiting or other constitutional symptoms. He does not have a puncture wound. There is no previous antecedent history of trauma. History was obtained by Patient and his wife    ROS/Meds/PSH/PMH/FH/SH: I personally reviewed the patients standard intake form. Below are the pertinents    Tobacco:  reports that he quit smoking about 16 years ago. His smoking use included cigarettes. He has a 30.00 pack-year smoking history. He has never used smokeless tobacco.  Diabetes: Diabetic - non insulin      Physical Examination:  Exam of the right foot shows some mild warmth and overlying redness which is global.  There is no fluctuance or sign of abscess noted. There is no sign of a septic joint. He has palpable pulses and diminished sensation secondary to his neuropathy at baseline. There is some mild instability in the midfoot joints. His anterior tibial tendon is intact. There is no sign of deep infection or ascending erythema.       Imaging:   I independently interpreted XR ordered by a different physician, taken from an outside facility and I independently interpreted a CT ordered by a different physician, taken from an outside facility of the right foot which showed no evidence of obvious fracture, osteomyelitis, or abscess        Assessment:   Right foot early Charcot

## 2023-03-28 DIAGNOSIS — I10 ESSENTIAL HYPERTENSION WITH GOAL BLOOD PRESSURE LESS THAN 130/85: Primary | ICD-10-CM

## 2023-03-28 RX ORDER — LISINOPRIL 30 MG/1
30 TABLET ORAL DAILY
Qty: 90 TABLET | Refills: 3 | Status: SHIPPED | OUTPATIENT
Start: 2023-03-28

## 2023-05-03 ENCOUNTER — OFFICE VISIT (OUTPATIENT)
Dept: ORTHOPEDIC SURGERY | Age: 83
End: 2023-05-03

## 2023-05-03 DIAGNOSIS — E11.610 CHARCOT FOOT DUE TO DIABETES MELLITUS (HCC): Primary | ICD-10-CM

## 2023-05-03 NOTE — PROGRESS NOTES
Name: Arik Faye  YOB: 1940  Gender: male  MRN: [id number]    Summary:     Right resolving Charcot foot     CC: Foot Pain (Right foot f/u will xray today )       HPI: Arik Faye is a 78 y.o. male who presents with Foot Pain (Right foot f/u will xray today )  . Patient presents back to the office today for follow-up of his right Charcot foot. He is doing massively better. He has not had a large weight loss which has not been explainable lately. History was obtained by Patient     ROS/Meds/PSH/PMH/FH/SH: I personally reviewed the patients standard intake form. Below are the pertinents    Tobacco:  reports that he quit smoking about 16 years ago. His smoking use included cigarettes. He has a 30.00 pack-year smoking history. He has never used smokeless tobacco.  Diabetes: Diabetic - Insulin dependent      Physical Examination:    Exam of the right foot shows his foot is really quite benign now. The swelling is completely resolved there is no warmth or erythema. Imaging:   I independently interpreted XR taken today  Foot XR: AP, Lateral, Oblique views     ICD-10-CM    1. Charcot foot due to diabetes mellitus (HCC)  E11.610 XR FOOT RIGHT (MIN 3 VIEWS)         Report: AP, lateral, oblique x-ray of the right foot demonstrates stable tarsometatarsal joints    Impression: Able tarsometatarsal joints   Filiberto Castaneda III, MD           Assessment:   Stable resolving Charcot arthropathy    Treatment Plan:   3 This is stable chronic illness/condition  Treatment at this time: Time with no intervention  Studies ordered: NO XR needed @ Next Visit    Weight-bearing status: WBAT        Return to work/work restrictions: none  No medications given    This appears to is resolved completely now. He is can go home and get no regular tennis shoe and return as needed.

## 2023-05-08 NOTE — TELEPHONE ENCOUNTER
MEDICATION REFILL REQUEST      Name of Medication:  eliquis   Dose:    Frequency:    Quantity:    Days' supply:  80      Pharmacy Name/Location:  did not leave

## 2023-05-17 ENCOUNTER — TELEPHONE (OUTPATIENT)
Age: 83
End: 2023-05-17

## 2023-05-17 NOTE — TELEPHONE ENCOUNTER
Patients wife called stating her  is being set up to receive his Eliquis from [facility]).  Patients wife states that a new prescription needs to be written and sent to:      [facility]     [phone]    Order # [id number]

## 2023-05-17 NOTE — TELEPHONE ENCOUNTER
Called patient and told him I would have  sign RX and fax to [facility]. Patient verbalized.  EOK

## 2023-05-23 ENCOUNTER — TELEPHONE (OUTPATIENT)
Age: 83
End: 2023-05-23

## 2023-05-23 NOTE — TELEPHONE ENCOUNTER
Wife-Mile called and said she just got off the phone with [facility] in [facility]) [phone] and was told they never received a prescription :apixaban (ELIQUIS) 5 MG TABS tablet [[id number]]     Order Details  Dose: 5 mg Route: Oral Frequency: 2 TIMES DAILY   Dispense Quantity: 90 tablet Refills: 7          Sig: Take 1 tablet by mouth 2 times daily   That was sent 05/18/2023. Please resend. Wife said when they were signed up they were issued order # [id number] if that will help.

## 2023-05-30 RX ORDER — DILTIAZEM HYDROCHLORIDE 240 MG/1
240 CAPSULE, COATED, EXTENDED RELEASE ORAL DAILY
Qty: 90 CAPSULE | Refills: 3 | Status: SHIPPED | OUTPATIENT
Start: 2023-05-30

## 2023-06-15 PROCEDURE — 93294 REM INTERROG EVL PM/LDLS PM: CPT | Performed by: INTERNAL MEDICINE

## 2023-06-15 PROCEDURE — 93296 REM INTERROG EVL PM/IDS: CPT | Performed by: INTERNAL MEDICINE

## 2023-06-19 ENCOUNTER — OFFICE VISIT (OUTPATIENT)
Dept: PULMONOLOGY | Age: 83
End: 2023-06-19
Payer: MEDICARE

## 2023-06-19 VITALS
BODY MASS INDEX: 24.64 KG/M2 | HEIGHT: 71 IN | DIASTOLIC BLOOD PRESSURE: 84 MMHG | RESPIRATION RATE: 14 BRPM | OXYGEN SATURATION: 96 % | HEART RATE: 88 BPM | SYSTOLIC BLOOD PRESSURE: 140 MMHG | WEIGHT: 176 LBS

## 2023-06-19 DIAGNOSIS — J44.9 CHRONIC OBSTRUCTIVE PULMONARY DISEASE, UNSPECIFIED COPD TYPE (HCC): Primary | ICD-10-CM

## 2023-06-19 DIAGNOSIS — R91.8 PULMONARY NODULES: ICD-10-CM

## 2023-06-19 DIAGNOSIS — D09.9 ADENOCARCINOMA IN SITU: ICD-10-CM

## 2023-06-19 PROCEDURE — 3077F SYST BP >= 140 MM HG: CPT | Performed by: INTERNAL MEDICINE

## 2023-06-19 PROCEDURE — G8427 DOCREV CUR MEDS BY ELIG CLIN: HCPCS | Performed by: INTERNAL MEDICINE

## 2023-06-19 PROCEDURE — 3079F DIAST BP 80-89 MM HG: CPT | Performed by: INTERNAL MEDICINE

## 2023-06-19 PROCEDURE — 1036F TOBACCO NON-USER: CPT | Performed by: INTERNAL MEDICINE

## 2023-06-19 PROCEDURE — 3023F SPIROM DOC REV: CPT | Performed by: INTERNAL MEDICINE

## 2023-06-19 PROCEDURE — 1123F ACP DISCUSS/DSCN MKR DOCD: CPT | Performed by: INTERNAL MEDICINE

## 2023-06-19 PROCEDURE — 99214 OFFICE O/P EST MOD 30 MIN: CPT | Performed by: INTERNAL MEDICINE

## 2023-06-19 PROCEDURE — G8420 CALC BMI NORM PARAMETERS: HCPCS | Performed by: INTERNAL MEDICINE

## 2023-06-19 RX ORDER — GLIPIZIDE 5 MG/1
TABLET ORAL
Qty: 45 TABLET | Refills: 3 | OUTPATIENT
Start: 2023-06-19

## 2023-06-19 RX ORDER — HYDROCHLOROTHIAZIDE 25 MG/1
25 TABLET ORAL DAILY
Qty: 90 TABLET | Refills: 3 | OUTPATIENT
Start: 2023-06-19

## 2023-06-19 NOTE — PROGRESS NOTES
extremities. ABDOMEN:   Soft and non-tender. No hepatosplenomegaly. Bowel sounds are normal.       NEURO:   The patient is alert and oriented to person, place, and time. Memory appears intact and mood is normal.  No gross sensorimotor deficits are present. Current Outpatient Medications   Medication Instructions    albuterol sulfate  (90 Base) MCG/ACT inhaler 2 puffs, Inhalation, EVERY 6 HOURS PRN    apixaban (ELIQUIS) 5 mg, Oral, 2 TIMES DAILY    carvedilol (COREG) 25 MG tablet TAKE 1 TABLET BY MOUTH  TWICE DAILY    dilTIAZem (CARDIZEM CD) 240 mg, Oral, DAILY    dilTIAZem (TIAZAC) 240 mg, Oral, DAILY    fluticasone-umeclidin-vilant (TRELEGY ELLIPTA) 100-62.5-25 MCG/ACT AEPB inhaler 1 puff, Inhalation, DAILY    glipiZIDE (GLUCOTROL) 2.5 mg, Oral, 2 TIMES DAILY    hydroCHLOROthiazide (HYDRODIURIL) 25 mg, Oral, DAILY    lisinopril (PRINIVIL;ZESTRIL) 30 mg, Oral, DAILY    lovastatin (MEVACOR) 20 mg, Oral, NIGHTLY, TAKE 1 TABLET BY MOUTH DAILY    metFORMIN (GLUCOPHAGE-XR) 1,000 mg, Oral, DAILY WITH DINNER         DIAGNOSTIC TESTS:    CXR:    No results found for this or any previous visit from the past 365 days. CT WITHOUT CONTRAST:    CT CHEST WO CONTRAST 06/12/2023    Narrative  CT of the chest without contrast.    CLINICAL INDICATION: Pulmonary nodules, followup examination. PROCEDURE: Serial thin section axial images are obtained from the thoracic inlet  through the upper abdomen without the administration of intravenous contrast.  Radiation dose reduction techniques were used for this study. Our CT scanners  use one or all of the following: Automated exposure control, adjusted of the mA  and/or kV according to patient size, iterative reconstruction    COMPARISON: Chest CT dated 12/27/2022 and 3/22/2022    FINDINGS:    No mediastinal or axillary adenopathy. A pacer device in place. Small bilateral  pleural effusions are present. There is no pericardial effusion. Emphysema is  present.  The

## 2023-06-28 DIAGNOSIS — Z95.0 CARDIAC PACEMAKER: ICD-10-CM

## 2023-06-28 DIAGNOSIS — I48.91 ATRIAL FIBRILLATION (HCC): Primary | ICD-10-CM

## 2023-06-28 DIAGNOSIS — I25.5 ISCHEMIC CARDIOMYOPATHY: ICD-10-CM

## 2023-07-26 ENCOUNTER — OFFICE VISIT (OUTPATIENT)
Age: 83
End: 2023-07-26

## 2023-07-26 VITALS
HEIGHT: 71 IN | HEART RATE: 86 BPM | WEIGHT: 176 LBS | DIASTOLIC BLOOD PRESSURE: 80 MMHG | SYSTOLIC BLOOD PRESSURE: 138 MMHG | BODY MASS INDEX: 24.64 KG/M2

## 2023-07-26 DIAGNOSIS — Z79.01 ANTICOAGULANT LONG-TERM USE: ICD-10-CM

## 2023-07-26 DIAGNOSIS — I48.21 PERMANENT ATRIAL FIBRILLATION (HCC): Primary | ICD-10-CM

## 2023-07-26 DIAGNOSIS — I42.8 OTHER CARDIOMYOPATHY (HCC): ICD-10-CM

## 2023-07-26 DIAGNOSIS — I25.810 CORONARY ARTERY DISEASE INVOLVING CORONARY BYPASS GRAFT OF NATIVE HEART WITHOUT ANGINA PECTORIS: ICD-10-CM

## 2023-07-26 RX ORDER — DILTIAZEM HYDROCHLORIDE 240 MG/1
240 CAPSULE, COATED, EXTENDED RELEASE ORAL DAILY
Qty: 90 CAPSULE | Refills: 3 | Status: SHIPPED | OUTPATIENT
Start: 2023-07-26

## 2023-07-26 ASSESSMENT — ENCOUNTER SYMPTOMS
ORTHOPNEA: 0
BLURRED VISION: 0
BACK PAIN: 0
NAUSEA: 0
SHORTNESS OF BREATH: 0
HEMOPTYSIS: 0
VOMITING: 0
ABDOMINAL PAIN: 0
BLOATING: 0
COUGH: 0
DOUBLE VISION: 0

## 2023-07-26 NOTE — PROGRESS NOTES
obstructive pulmonary disease (HCC)    S/P AAA (abdominal aortic aneurysm) repair    Diabetes (HCC)    Prostatitis    Acquired deformity of nose    SOB (shortness of breath)    Hyperlipidemia    Prostate cancer (HCC)    Non-rheumatic mitral regurgitation    Neuropathy    Transient ischemic attack    Lung nodule    Cardiac pacemaker    Abdominal aortic aneurysm without rupture (HCC)    Atherosclerosis of native coronary artery of native heart without angina pectoris    Type 2 diabetes mellitus with nephropathy (HCC)    Peripheral vascular disease (HCC)    Biventricular cardiac pacemaker in situ    Ischemic cardiomyopathy    Chronic kidney disease (CKD), stage II (mild)    Long term current use of anticoagulant    Status post biventricular pacemaker    Mixed hyperlipidemia    Dysuria    Personal history of tobacco use    Lumbar stenosis with neurogenic claudication    Encounter for pacemaker at end of battery life    Thrombocytopenia ([address])    Adenocarcinoma of left lung ([address])    Chronic renal disease, stage III ([address]) [[id number]]     Past Medical History:   Diagnosis Date    AAA (abdominal aortic aneurysm) ([address])     leakage from stent repaired     Ambulates with cane     single cane    Anticoagulated on Coumadin     pt instructed to hold x 5 days     Aortic regurgitation 08/09/2016    mild to moderate per echo     Arthritis     Asthma     Atrial fibrillation ([address])     permanent- Pacemaker    Cardiac pacemaker 07/20/2016    medtronic - copy of card on chart- pacer interr 7.25.18-negative for pacer dependent    Chronic kidney disease (CKD), stage II (mild)     pt denies    Chronic obstructive pulmonary disease (HCC)     inhalers    Coronary atherosclerosis of native coronary vessel     Followed by [facility]    Essential hypertension     managed well with meds    Former smoker     35 yrs, 1 pk per day.  quit 2006    Hearing impairment     hearing aids to hemant ears    History of AAA (abdominal aortic aneurysm) repair 2014

## 2023-08-25 DIAGNOSIS — I42.8 OTHER CARDIOMYOPATHY (HCC): ICD-10-CM

## 2023-08-25 DIAGNOSIS — Z95.0 CARDIAC PACEMAKER: ICD-10-CM

## 2023-08-30 DIAGNOSIS — Z95.0 CARDIAC PACEMAKER: Primary | ICD-10-CM

## 2023-08-30 DIAGNOSIS — I25.5 ISCHEMIC CARDIOMYOPATHY: ICD-10-CM

## 2023-08-30 DIAGNOSIS — I48.91 ATRIAL FIBRILLATION (HCC): ICD-10-CM

## 2023-09-18 PROCEDURE — 93294 REM INTERROG EVL PM/LDLS PM: CPT | Performed by: INTERNAL MEDICINE

## 2023-09-18 PROCEDURE — 93296 REM INTERROG EVL PM/IDS: CPT | Performed by: INTERNAL MEDICINE

## 2023-10-11 ENCOUNTER — OFFICE VISIT (OUTPATIENT)
Dept: INTERNAL MEDICINE CLINIC | Facility: CLINIC | Age: 83
End: 2023-10-11
Payer: MEDICARE

## 2023-10-11 VITALS
DIASTOLIC BLOOD PRESSURE: 78 MMHG | SYSTOLIC BLOOD PRESSURE: 136 MMHG | WEIGHT: 175.2 LBS | HEART RATE: 77 BPM | BODY MASS INDEX: 24.44 KG/M2 | OXYGEN SATURATION: 96 %

## 2023-10-11 DIAGNOSIS — N18.30 STAGE 3 CHRONIC KIDNEY DISEASE, UNSPECIFIED WHETHER STAGE 3A OR 3B CKD (HCC): ICD-10-CM

## 2023-10-11 DIAGNOSIS — E11.21 TYPE 2 DIABETES MELLITUS WITH NEPHROPATHY (HCC): ICD-10-CM

## 2023-10-11 DIAGNOSIS — D64.9 ANEMIA, UNSPECIFIED TYPE: Primary | ICD-10-CM

## 2023-10-11 DIAGNOSIS — D64.9 ANEMIA, UNSPECIFIED TYPE: ICD-10-CM

## 2023-10-11 PROCEDURE — 1123F ACP DISCUSS/DSCN MKR DOCD: CPT | Performed by: INTERNAL MEDICINE

## 2023-10-11 PROCEDURE — 99215 OFFICE O/P EST HI 40 MIN: CPT | Performed by: INTERNAL MEDICINE

## 2023-10-11 PROCEDURE — 3075F SYST BP GE 130 - 139MM HG: CPT | Performed by: INTERNAL MEDICINE

## 2023-10-11 PROCEDURE — G8420 CALC BMI NORM PARAMETERS: HCPCS | Performed by: INTERNAL MEDICINE

## 2023-10-11 PROCEDURE — G8484 FLU IMMUNIZE NO ADMIN: HCPCS | Performed by: INTERNAL MEDICINE

## 2023-10-11 PROCEDURE — 3078F DIAST BP <80 MM HG: CPT | Performed by: INTERNAL MEDICINE

## 2023-10-11 PROCEDURE — G8427 DOCREV CUR MEDS BY ELIG CLIN: HCPCS | Performed by: INTERNAL MEDICINE

## 2023-10-11 PROCEDURE — 1036F TOBACCO NON-USER: CPT | Performed by: INTERNAL MEDICINE

## 2023-10-11 PROCEDURE — 3044F HG A1C LEVEL LT 7.0%: CPT | Performed by: INTERNAL MEDICINE

## 2023-10-11 RX ORDER — TAMSULOSIN HYDROCHLORIDE 0.4 MG/1
CAPSULE ORAL
COMMUNITY
Start: 2023-09-20

## 2023-10-11 RX ORDER — HYDROCODONE BITARTRATE AND ACETAMINOPHEN 7.5; 325 MG/1; MG/1
TABLET ORAL
COMMUNITY
Start: 2023-10-05

## 2023-10-11 RX ORDER — DOXYCYCLINE HYCLATE 100 MG/1
CAPSULE ORAL
COMMUNITY
Start: 2023-10-06

## 2023-10-11 ASSESSMENT — ENCOUNTER SYMPTOMS
COUGH: 0
SHORTNESS OF BREATH: 0
ABDOMINAL PAIN: 0

## 2023-10-11 NOTE — PROGRESS NOTES
for this pt today  Over 50% of today's office visit was spent in face to face time in counseling   (may include anyor all of the following: reviewing test results, prognosis, importance of compliance, education about disease process, benefits of medications, instructions for management of acute flare-ups, and follow up plans).

## 2023-11-03 ENCOUNTER — PATIENT MESSAGE (OUTPATIENT)
Dept: INTERNAL MEDICINE CLINIC | Facility: CLINIC | Age: 83
End: 2023-11-03

## 2023-11-03 RX ORDER — BUDESONIDE AND FORMOTEROL FUMARATE DIHYDRATE 160; 4.5 UG/1; UG/1
2 AEROSOL RESPIRATORY (INHALATION) 2 TIMES DAILY
Qty: 30.6 G | Refills: 1 | Status: SHIPPED | OUTPATIENT
Start: 2023-11-03

## 2023-11-03 RX ORDER — ALBUTEROL SULFATE 90 UG/1
2 AEROSOL, METERED RESPIRATORY (INHALATION) EVERY 6 HOURS PRN
Qty: 18 G | Refills: 1 | Status: SHIPPED | OUTPATIENT
Start: 2023-11-03

## 2023-11-03 NOTE — TELEPHONE ENCOUNTER
From: Leann Blood  To: Dr. Cara Edwards: 11/3/2023 11:43 AM EDT  Subject: need two new prescriptions    1) albuterol sulfate hfa inhalation aerosol    2) symbicort 160/4.5    Please fax prescriptions to:  [facility]  [phone] [facility]

## 2023-11-16 ENCOUNTER — TELEPHONE (OUTPATIENT)
Dept: PULMONOLOGY | Age: 83
End: 2023-11-16

## 2023-11-16 NOTE — TELEPHONE ENCOUNTER
Patient states he has questions and concerns about medications and scheduling. He asked for Sully to call him.

## 2023-11-17 ENCOUNTER — TELEPHONE (OUTPATIENT)
Dept: PULMONOLOGY | Age: 83
End: 2023-11-17

## 2023-11-17 NOTE — TELEPHONE ENCOUNTER
Patient's 02 stat has been dropping to 74% in the mornings. The highest that it has been is 92%. But mainly staying in the 80's. This has been going on for the last 8 days . He is not on any oxygen .

## 2023-11-17 NOTE — TELEPHONE ENCOUNTER
Spoke to pt regarding low O2 levels. Pt states O2 levels are in the 70's upon waking in the AM and it rises to the 90's. Pt states his O2 levels occasionally drop into the 80's with activity during the day. Pt informed that he needs to seek emergency care in the event his O2 levels drop into the 70s and 80s. Pt says he used to wear oxygen \"many years ago\" and he would like to attempt and oxygen qualifying walk and be evaluated by an MD. Pt scheduled on 11/22 with Dr. Arline Urrutia.

## 2023-11-17 NOTE — TELEPHONE ENCOUNTER
Alek Rios I called patient to see if I could help him but he rather you to call Patient states he has questions and concerns about medications and scheduling.  He asked for Alek Rios to call him.  James dupont

## 2023-11-21 ENCOUNTER — TELEPHONE (OUTPATIENT)
Dept: INTERNAL MEDICINE CLINIC | Facility: CLINIC | Age: 83
End: 2023-11-21

## 2023-11-21 NOTE — TELEPHONE ENCOUNTER
Left message for patient that questions regarding medications can be addressed at his appointment tomorrow.

## 2023-11-29 LAB
ALBUMIN SERPL-MCNC: 2.9 G/DL (ref 3.2–4.6)
ALBUMIN/GLOB SERPL: 0.7 (ref 0.4–1.6)
ALP SERPL-CCNC: 85 U/L (ref 50–136)
ALT SERPL-CCNC: 18 U/L (ref 12–65)
ANION GAP SERPL CALC-SCNC: 6 MMOL/L (ref 2–11)
AST SERPL-CCNC: 17 U/L (ref 15–37)
BASOPHILS # BLD: 0 K/UL (ref 0–0.2)
BASOPHILS NFR BLD: 0 % (ref 0–2)
BILIRUB SERPL-MCNC: 0.5 MG/DL (ref 0.2–1.1)
BUN SERPL-MCNC: 47 MG/DL (ref 8–23)
CALCIUM SERPL-MCNC: 10 MG/DL (ref 8.3–10.4)
CHLORIDE SERPL-SCNC: 106 MMOL/L (ref 101–110)
CO2 SERPL-SCNC: 28 MMOL/L (ref 21–32)
CREAT SERPL-MCNC: 1.8 MG/DL (ref 0.8–1.5)
DIFFERENTIAL METHOD BLD: ABNORMAL
EOSINOPHIL # BLD: 0.3 K/UL (ref 0–0.8)
EOSINOPHIL NFR BLD: 5 % (ref 0.5–7.8)
ERYTHROCYTE [DISTWIDTH] IN BLOOD BY AUTOMATED COUNT: 13.2 % (ref 11.9–14.6)
FERRITIN SERPL-MCNC: 92 NG/ML (ref 8–388)
FOLATE SERPL-MCNC: 15.3 NG/ML (ref 3.1–17.5)
GLOBULIN SER CALC-MCNC: 4.2 G/DL (ref 2.8–4.5)
GLUCOSE SERPL-MCNC: 227 MG/DL (ref 65–100)
HCT VFR BLD AUTO: 34.4 % (ref 41.1–50.3)
HGB BLD-MCNC: 10.9 G/DL (ref 13.6–17.2)
IMM GRANULOCYTES # BLD AUTO: 0 K/UL (ref 0–0.5)
IMM GRANULOCYTES NFR BLD AUTO: 0 % (ref 0–5)
IRON SERPL-MCNC: 34 UG/DL (ref 35–150)
LYMPHOCYTES # BLD: 1.5 K/UL (ref 0.5–4.6)
LYMPHOCYTES NFR BLD: 30 % (ref 13–44)
MCH RBC QN AUTO: 29.7 PG (ref 26.1–32.9)
MCHC RBC AUTO-ENTMCNC: 31.7 G/DL (ref 31.4–35)
MCV RBC AUTO: 93.7 FL (ref 82–102)
MONOCYTES # BLD: 0.5 K/UL (ref 0.1–1.3)
MONOCYTES NFR BLD: 9 % (ref 4–12)
NEUTS SEG # BLD: 2.6 K/UL (ref 1.7–8.2)
NEUTS SEG NFR BLD: 56 % (ref 43–78)
NRBC # BLD: 0 K/UL (ref 0–0.2)
PLATELET # BLD AUTO: 137 K/UL (ref 150–450)
PMV BLD AUTO: 10.9 FL (ref 9.4–12.3)
POTASSIUM SERPL-SCNC: 4 MMOL/L (ref 3.5–5.1)
PROT SERPL-MCNC: 7.1 G/DL (ref 6.3–8.2)
RBC # BLD AUTO: 3.67 M/UL (ref 4.23–5.6)
SODIUM SERPL-SCNC: 140 MMOL/L (ref 133–143)
VIT B12 SERPL-MCNC: 605 PG/ML (ref 193–986)
WBC # BLD AUTO: 4.8 K/UL (ref 4.3–11.1)

## 2023-11-30 ENCOUNTER — OFFICE VISIT (OUTPATIENT)
Dept: INTERNAL MEDICINE CLINIC | Facility: CLINIC | Age: 83
End: 2023-11-30

## 2023-11-30 VITALS
WEIGHT: 163 LBS | SYSTOLIC BLOOD PRESSURE: 128 MMHG | OXYGEN SATURATION: 96 % | HEART RATE: 81 BPM | DIASTOLIC BLOOD PRESSURE: 70 MMHG | BODY MASS INDEX: 22.73 KG/M2

## 2023-11-30 DIAGNOSIS — Z09 HOSPITAL DISCHARGE FOLLOW-UP: Primary | ICD-10-CM

## 2023-11-30 DIAGNOSIS — N18.9 CHRONIC KIDNEY DISEASE, UNSPECIFIED CKD STAGE: ICD-10-CM

## 2023-11-30 DIAGNOSIS — E11.21 TYPE 2 DIABETES MELLITUS WITH NEPHROPATHY (HCC): ICD-10-CM

## 2023-11-30 LAB
EST. AVERAGE GLUCOSE BLD GHB EST-MCNC: 180 MG/DL
HBA1C MFR BLD: 7.9 % (ref 4.8–5.6)
PATH REV BLD -IMP: NORMAL

## 2023-11-30 RX ORDER — GLIPIZIDE 5 MG/1
5 TABLET, FILM COATED, EXTENDED RELEASE ORAL DAILY
Qty: 90 TABLET | Refills: 1 | Status: SHIPPED | OUTPATIENT
Start: 2023-11-30

## 2023-11-30 ASSESSMENT — ENCOUNTER SYMPTOMS
RESPIRATORY NEGATIVE: 1
GASTROINTESTINAL NEGATIVE: 1

## 2023-12-04 ENCOUNTER — PATIENT MESSAGE (OUTPATIENT)
Dept: PULMONOLOGY | Age: 83
End: 2023-12-04

## 2023-12-04 ENCOUNTER — TELEPHONE (OUTPATIENT)
Dept: INTERNAL MEDICINE CLINIC | Facility: CLINIC | Age: 83
End: 2023-12-04

## 2023-12-04 NOTE — TELEPHONE ENCOUNTER
Patient notified he would need to contact Bishop Karla MD to get refills on TRELEGY ELLIPTA) 100-62.5-25   MCG/ACT AEPB inhaler verbalized understanding

## 2023-12-04 NOTE — TELEPHONE ENCOUNTER
----- Message from Errol Miller sent at 12/4/2023 11:14 AM EST -----  Subject: Medication Problem    Medication: fluticasone-umeclidin-vilant (TRELEGY ELLIPTA) 100-62.5-25   MCG/ACT AEPB inhaler  Dosage: 1 dose per day  Ordering Provider: Dr. Mojgan Mejia    Question/Problem: Wants a 90 day prescription of this. For the last week   he has had a problem where no one can solve it. He was in there last week. Dr. Prudencio Boast nurse said she would take care of getting this. It takes 4-6   weeks to fulfill this. He needs this for 90 days. He wants this to go to   [facility]. This is a [employer]. Fax is on the website. He has   no other information for this pharmacy. Please note that it is not the one   I listed in the Pharmacy selection. Required to choose and could not find   that one. Do not sent to [facility].        Pharmacy: [id number], [address] [place] [address] [phone]    ---------------------------------------------------------------------------  --------------  Ashleigh GARCIA  [phone]; OK to leave message on voicemail  ---------------------------------------------------------------------------  --------------    SCRIPT ANSWERS  Relationship to Patient: Self

## 2023-12-07 RX ORDER — FLUTICASONE FUROATE, UMECLIDINIUM BROMIDE AND VILANTEROL TRIFENATATE 100; 62.5; 25 UG/1; UG/1; UG/1
1 POWDER RESPIRATORY (INHALATION) DAILY
Qty: 3 EACH | Refills: 3 | Status: SHIPPED | OUTPATIENT
Start: 2023-12-07

## 2023-12-07 NOTE — TELEPHONE ENCOUNTER
From: Clara Reasons  To: Dr. Abigail Thomas: 12/4/2023 3:25 PM EST  Subject: new perscription    I need a new 90 day prescription for:     Trelegy ellipta 100/62.525 /   Please send prescription to:  [facility]  Fax # [phone] now as it takes 4/6 weeks to deliver drugs    Thank You Celso Polk

## 2023-12-08 ENCOUNTER — TELEPHONE (OUTPATIENT)
Dept: PULMONOLOGY | Age: 83
End: 2023-12-08

## 2023-12-08 NOTE — TELEPHONE ENCOUNTER
Patient is calling concerning prescription request for Trelegy.   This was sent to [facility] instead of :      fluticasone-umeclidin-vilant (Arianne Brown) 100-62.5-25 MCG/ACT [employer] inhaler    [facility]/Fax#-[phone]    Needs this done today please

## 2023-12-15 ENCOUNTER — OFFICE VISIT (OUTPATIENT)
Dept: INTERNAL MEDICINE CLINIC | Facility: CLINIC | Age: 83
End: 2023-12-15

## 2023-12-15 VITALS
HEART RATE: 75 BPM | DIASTOLIC BLOOD PRESSURE: 62 MMHG | OXYGEN SATURATION: 94 % | SYSTOLIC BLOOD PRESSURE: 104 MMHG | WEIGHT: 165.6 LBS | HEIGHT: 72 IN | BODY MASS INDEX: 22.43 KG/M2

## 2023-12-15 DIAGNOSIS — E55.9 VITAMIN D DEFICIENCY: ICD-10-CM

## 2023-12-15 DIAGNOSIS — N18.9 CHRONIC KIDNEY DISEASE, UNSPECIFIED CKD STAGE: ICD-10-CM

## 2023-12-15 DIAGNOSIS — E61.1 IRON DEFICIENCY: ICD-10-CM

## 2023-12-15 DIAGNOSIS — E11.21 TYPE 2 DIABETES MELLITUS WITH NEPHROPATHY (HCC): ICD-10-CM

## 2023-12-15 DIAGNOSIS — Z00.00 MEDICARE ANNUAL WELLNESS VISIT, SUBSEQUENT: Primary | ICD-10-CM

## 2023-12-15 DIAGNOSIS — Z71.89 ADVANCE CARE PLANNING: ICD-10-CM

## 2023-12-15 DIAGNOSIS — Z23 NEED FOR PNEUMOCOCCAL VACCINATION: ICD-10-CM

## 2023-12-15 DIAGNOSIS — D69.6 THROMBOCYTOPENIA (HCC): ICD-10-CM

## 2023-12-15 RX ORDER — AMOXICILLIN 500 MG/1
500 CAPSULE ORAL
COMMUNITY
Start: 2023-12-11

## 2023-12-15 RX ORDER — ORAL SEMAGLUTIDE 7 MG/1
1 TABLET ORAL DAILY
Qty: 30 TABLET | Refills: 3 | Status: SHIPPED | OUTPATIENT
Start: 2023-12-15

## 2023-12-15 RX ORDER — FERROUS SULFATE 325(65) MG
325 TABLET ORAL
Qty: 90 TABLET | Refills: 0 | Status: SHIPPED | OUTPATIENT
Start: 2023-12-15

## 2023-12-15 ASSESSMENT — LIFESTYLE VARIABLES
HOW OFTEN DO YOU HAVE A DRINK CONTAINING ALCOHOL: 2-4 TIMES A MONTH
HOW MANY STANDARD DRINKS CONTAINING ALCOHOL DO YOU HAVE ON A TYPICAL DAY: 1 OR 2

## 2023-12-15 ASSESSMENT — PATIENT HEALTH QUESTIONNAIRE - PHQ9
1. LITTLE INTEREST OR PLEASURE IN DOING THINGS: 0
SUM OF ALL RESPONSES TO PHQ9 QUESTIONS 1 & 2: 0
SUM OF ALL RESPONSES TO PHQ QUESTIONS 1-9: 0
SUM OF ALL RESPONSES TO PHQ QUESTIONS 1-9: 0
2. FEELING DOWN, DEPRESSED OR HOPELESS: 0
SUM OF ALL RESPONSES TO PHQ QUESTIONS 1-9: 0
SUM OF ALL RESPONSES TO PHQ QUESTIONS 1-9: 0

## 2023-12-15 NOTE — PROGRESS NOTES
SUBJECTIVE:   Peter Valencia is a 78 y.o. male seen for a visit regarding   Chief Complaint   Patient presents with    Medicare AWV        HPI  , lives in [address] - on Tamsulosin  Skin cancer left ear - seeing Dermatology  Lung cancer, copd - on CT follow up with Pulmonary - has appt. On 12/19; does not want surgery or chemo if the cancer grows; does not want to see oncology at this time; will reassess in future  Memory problems - short term, able to manage finances and medications, since 1 year, came on gradually  Diabetes - Hba1c is 7.9, stopped glipizide due to worsening kidney function  Iron deficiency anemia, thrombocytopenia - no blood in stools or urine  CKD-3  Atrial Fibrillation, s/p pacemaker, CAD s/p CABG, AAA, Hyperlipidemia - seeing cardiology  Had flu vaccine per patient    Past Medical History, Past Surgical History, Family history, Social History, and Medications were all reviewed with the patient today and updated as necessary.        Current Outpatient Medications   Medication Sig Dispense Refill    amoxicillin (AMOXIL) 500 MG capsule Take 1 capsule by mouth      Semaglutide (RYBELSUS) 7 MG TABS Take 1 tablet by mouth daily 30 tablet 3    ferrous sulfate (IRON 325) 325 (65 Fe) MG tablet Take 1 tablet by mouth daily (with breakfast) 90 tablet 0    fluticasone-umeclidin-vilant (TRELEGY ELLIPTA) 100-62.5-25 MCG/ACT AEPB inhaler Inhale 1 puff into the lungs daily 3 each 3    albuterol sulfate HFA (PROVENTIL;VENTOLIN;PROAIR) 108 (90 Base) MCG/ACT inhaler Inhale 2 puffs into the lungs every 6 hours as needed for Wheezing 18 g 1    tamsulosin (FLOMAX) 0.4 MG capsule       dilTIAZem (CARDIZEM CD) 240 MG extended release capsule Take 1 capsule by mouth daily 90 capsule 3    apixaban (ELIQUIS) 5 MG TABS tablet Take 1 tablet by mouth 2 times daily 180 tablet 3    lisinopril (PRINIVIL;ZESTRIL) 30 MG tablet Take 1 tablet by mouth daily 90 tablet 3    carvedilol (COREG) 25 MG tablet TAKE 1

## 2023-12-15 NOTE — ACP (ADVANCE CARE PLANNING)
Advance Care Planning     Advance Care Planning (ACP) Physician/NP/PA Conversation    Date of Conversation: 12/15/2023  Conducted with: Patient with Decision Making Capacity    Healthcare Decision Maker:      Primary Decision Maker: John Plata - [phone]    Click here to complete Healthcare Decision Makers including selection of the Healthcare Decision Maker Relationship (ie \"Primary\")  Today we documented Decision Maker(s) consistent with Legal Next of Kin hierarchy. Care Preferences:    Hospitalization: \"If your health worsens and it becomes clear that your chance of recovery is unlikely, what would be your preference regarding hospitalization? \"  The patient would prefer comfort-focused treatment without hospitalization. Ventilation: \"If you were unable to breath on your own and your chance of recovery was unlikely, what would be your preference about the use of a ventilator (breathing machine) if it was available to you? \"  The patient would NOT desire the use of a ventilator. Resuscitation: \"In the event your heart stopped as a result of an underlying serious health condition, would you want attempts made to restart your heart, or would you prefer a natural death? \"  No, do NOT attempt to resuscitate.     treatment goals, benefit/burden of treatment options, artificial nutrition, ventilation preferences, hospitalization preferences, resuscitation preferences, and end of life care preferences (vegetative state/imminent death)    Conversation Outcomes / Follow-Up Plan:  ACP complete - no further action today  Reviewed DNR/DNI and patient elects Full Code (Attempt Resuscitation)    Length of Voluntary ACP Conversation in minutes:  16 minutes    Josiah Gambino MD

## 2023-12-21 PROCEDURE — 93296 REM INTERROG EVL PM/IDS: CPT | Performed by: INTERNAL MEDICINE

## 2023-12-21 PROCEDURE — 93294 REM INTERROG EVL PM/LDLS PM: CPT | Performed by: INTERNAL MEDICINE

## 2023-12-26 ENCOUNTER — TELEPHONE (OUTPATIENT)
Dept: PULMONOLOGY | Age: 83
End: 2023-12-26

## 2023-12-26 DIAGNOSIS — R91.1 PULMONARY NODULE: Primary | ICD-10-CM

## 2023-12-26 DIAGNOSIS — D09.9 ADENOCARCINOMA IN SITU: ICD-10-CM

## 2023-12-26 DIAGNOSIS — Z87.891 PERSONAL HISTORY OF TOBACCO USE, PRESENTING HAZARDS TO HEALTH: ICD-10-CM

## 2023-12-27 NOTE — TELEPHONE ENCOUNTER
Spoke with the patient's spouse Jill Perez) and notified her that I fixed the diagnosis code for the CT order and that they should be able to get the CT scan scheduled. Jill Perez verbalized understanding and will notify the patient. No further questions or concerns were asked at this time.  // Elie Castelan

## 2023-12-27 NOTE — TELEPHONE ENCOUNTER
Order has been fixed with the correct diagnosis. Tried calling patient but was not successful in reaching them. I had to leave them a voicemail to give me a call back.  // Radha Lopez

## 2024-01-03 ENCOUNTER — TELEPHONE (OUTPATIENT)
Age: 84
End: 2024-01-03

## 2024-01-03 DIAGNOSIS — D69.6 THROMBOCYTOPENIA (HCC): ICD-10-CM

## 2024-01-03 DIAGNOSIS — E61.1 IRON DEFICIENCY: ICD-10-CM

## 2024-01-03 DIAGNOSIS — E55.9 VITAMIN D DEFICIENCY: ICD-10-CM

## 2024-01-03 DIAGNOSIS — E11.21 TYPE 2 DIABETES MELLITUS WITH NEPHROPATHY (HCC): ICD-10-CM

## 2024-01-03 DIAGNOSIS — N18.9 CHRONIC KIDNEY DISEASE, UNSPECIFIED CKD STAGE: ICD-10-CM

## 2024-01-03 LAB
ALBUMIN SERPL-MCNC: 3.4 G/DL (ref 3.2–4.6)
ALBUMIN/GLOB SERPL: 1 (ref 0.4–1.6)
ALP SERPL-CCNC: 81 U/L (ref 50–136)
ALT SERPL-CCNC: 15 U/L (ref 12–65)
ANION GAP SERPL CALC-SCNC: 5 MMOL/L (ref 2–11)
AST SERPL-CCNC: 17 U/L (ref 15–37)
BASOPHILS # BLD: 0 K/UL (ref 0–0.2)
BASOPHILS NFR BLD: 1 % (ref 0–2)
BILIRUB SERPL-MCNC: 0.4 MG/DL (ref 0.2–1.1)
BUN SERPL-MCNC: 28 MG/DL (ref 8–23)
CALCIUM SERPL-MCNC: 8.6 MG/DL (ref 8.3–10.4)
CHLORIDE SERPL-SCNC: 110 MMOL/L (ref 103–113)
CHOLEST SERPL-MCNC: 121 MG/DL
CO2 SERPL-SCNC: 25 MMOL/L (ref 21–32)
CREAT SERPL-MCNC: 1.3 MG/DL (ref 0.8–1.5)
DIFFERENTIAL METHOD BLD: ABNORMAL
EOSINOPHIL # BLD: 0.1 K/UL (ref 0–0.8)
EOSINOPHIL NFR BLD: 1 % (ref 0.5–7.8)
ERYTHROCYTE [DISTWIDTH] IN BLOOD BY AUTOMATED COUNT: 14.5 % (ref 11.9–14.6)
GLOBULIN SER CALC-MCNC: 3.3 G/DL (ref 2.8–4.5)
GLUCOSE SERPL-MCNC: 184 MG/DL (ref 65–100)
HCT VFR BLD AUTO: 33.2 % (ref 41.1–50.3)
HDLC SERPL-MCNC: 38 MG/DL (ref 40–60)
HDLC SERPL: 3.2
HGB BLD-MCNC: 10.3 G/DL (ref 13.6–17.2)
IMM GRANULOCYTES # BLD AUTO: 0 K/UL (ref 0–0.5)
IMM GRANULOCYTES NFR BLD AUTO: 1 % (ref 0–5)
IRON SERPL-MCNC: 59 UG/DL (ref 35–150)
LDLC SERPL CALC-MCNC: 64.6 MG/DL
LYMPHOCYTES # BLD: 1.4 K/UL (ref 0.5–4.6)
LYMPHOCYTES NFR BLD: 32 % (ref 13–44)
MCH RBC QN AUTO: 29.3 PG (ref 26.1–32.9)
MCHC RBC AUTO-ENTMCNC: 31 G/DL (ref 31.4–35)
MCV RBC AUTO: 94.3 FL (ref 82–102)
MONOCYTES # BLD: 0.4 K/UL (ref 0.1–1.3)
MONOCYTES NFR BLD: 8 % (ref 4–12)
NEUTS SEG # BLD: 2.5 K/UL (ref 1.7–8.2)
NEUTS SEG NFR BLD: 57 % (ref 43–78)
NRBC # BLD: 0 K/UL (ref 0–0.2)
PLATELET # BLD AUTO: 126 K/UL (ref 150–450)
PMV BLD AUTO: 9.9 FL (ref 9.4–12.3)
POTASSIUM SERPL-SCNC: 4.1 MMOL/L (ref 3.5–5.1)
PROT SERPL-MCNC: 6.7 G/DL (ref 6.3–8.2)
RBC # BLD AUTO: 3.52 M/UL (ref 4.23–5.6)
SODIUM SERPL-SCNC: 140 MMOL/L (ref 136–146)
TRIGL SERPL-MCNC: 92 MG/DL (ref 35–150)
VLDLC SERPL CALC-MCNC: 18.4 MG/DL (ref 6–23)
WBC # BLD AUTO: 4.3 K/UL (ref 4.3–11.1)

## 2024-01-03 NOTE — TELEPHONE ENCOUNTER
Per Dr. Dumont, \"Extractions do not require cardiac clearance.  Current recommendations are to continue anticoagulation with extractions\".

## 2024-01-03 NOTE — TELEPHONE ENCOUNTER
Patient having extraction of lower right 2nd molar with Dr. Modi under lidocaine, marcaine, articaine on TBD. Requesting cardiac clearance and eliquis hold ( please advise the hold) Fax: [phone]

## 2024-01-04 LAB
25(OH)D3 SERPL-MCNC: 47.3 NG/ML (ref 30–100)
EST. AVERAGE GLUCOSE BLD GHB EST-MCNC: 163 MG/DL
HBA1C MFR BLD: 7.3 % (ref 4.8–5.6)

## 2024-01-08 ENCOUNTER — TELEPHONE (OUTPATIENT)
Dept: INTERNAL MEDICINE CLINIC | Facility: CLINIC | Age: 84
End: 2024-01-08

## 2024-01-08 LAB
ALBUMIN SERPL ELPH-MCNC: 3.7 G/DL (ref 2.9–4.4)
ALBUMIN/GLOB SERPL: 1.4 (ref 0.7–1.7)
ALPHA1 GLOB SERPL ELPH-MCNC: 0.2 G/DL (ref 0–0.4)
ALPHA2 GLOB SERPL ELPH-MCNC: 0.6 G/DL (ref 0.4–1)
B-GLOBULIN SERPL ELPH-MCNC: 0.9 G/DL (ref 0.7–1.3)
GAMMA GLOB SERPL ELPH-MCNC: 1 G/DL (ref 0.4–1.8)
GLOBULIN SER-MCNC: 2.7 G/DL (ref 2.2–3.9)
IGA SERPL-MCNC: 456 MG/DL (ref 61–437)
IGG SERPL-MCNC: 985 MG/DL (ref 603–1613)
IGM SERPL-MCNC: 34 MG/DL (ref 15–143)
INTERPRETATION SERPL IEP-IMP: ABNORMAL
M PROTEIN SERPL ELPH-MCNC: ABNORMAL G/DL
PROT SERPL-MCNC: 6.4 G/DL (ref 6–8.5)

## 2024-01-08 NOTE — TELEPHONE ENCOUNTER
Patient presented to the office on 1/5/24 for his visit with Dr. Trevino. He expressed frustration with the MA stating that no one told him there was going to be a wait and he drove almost 2 hours from [place] for this appointment to discuss medication changes. He asked to speak to the . MA came and got me and I expressed my apologies to the patient for him not being informed about the delay. I asked him if he would like to reschedule and he said no and that he was going to leave and may look for a new doctor in [place]. MA spoke with Dr. Trevino who advised that we could do a virtual instead. Patient was contacted and switched to a virtual visit. Patient was contacted for appt and did not answer the phone. Patient then called back and said cancel the visit altogether as he was going to weigh his options.

## 2024-02-09 ENCOUNTER — HOSPITAL ENCOUNTER (OUTPATIENT)
Dept: CT IMAGING | Age: 84
End: 2024-02-09
Attending: INTERNAL MEDICINE
Payer: MEDICARE

## 2024-02-09 DIAGNOSIS — R91.1 PULMONARY NODULE: ICD-10-CM

## 2024-02-09 PROCEDURE — 71250 CT THORAX DX C-: CPT

## 2024-02-16 ENCOUNTER — OFFICE VISIT (OUTPATIENT)
Dept: PULMONOLOGY | Age: 84
End: 2024-02-16
Payer: MEDICARE

## 2024-02-16 VITALS
BODY MASS INDEX: 23.38 KG/M2 | SYSTOLIC BLOOD PRESSURE: 114 MMHG | HEIGHT: 71 IN | WEIGHT: 167 LBS | RESPIRATION RATE: 18 BRPM | TEMPERATURE: 97.6 F | HEART RATE: 90 BPM | OXYGEN SATURATION: 90 % | DIASTOLIC BLOOD PRESSURE: 70 MMHG

## 2024-02-16 DIAGNOSIS — J44.9 CHRONIC OBSTRUCTIVE PULMONARY DISEASE, UNSPECIFIED COPD TYPE (HCC): Primary | ICD-10-CM

## 2024-02-16 DIAGNOSIS — D09.9 ADENOCARCINOMA IN SITU: ICD-10-CM

## 2024-02-16 DIAGNOSIS — J90 PLEURAL EFFUSION: ICD-10-CM

## 2024-02-16 DIAGNOSIS — C34.92 BRONCHOALVEOLAR CARCINOMA, LEFT (HCC): ICD-10-CM

## 2024-02-16 DIAGNOSIS — Z87.891 PERSONAL HISTORY OF TOBACCO USE, PRESENTING HAZARDS TO HEALTH: ICD-10-CM

## 2024-02-16 DIAGNOSIS — R22.1 MASS OF LEFT SIDE OF NECK: ICD-10-CM

## 2024-02-16 PROCEDURE — G8484 FLU IMMUNIZE NO ADMIN: HCPCS | Performed by: INTERNAL MEDICINE

## 2024-02-16 PROCEDURE — 1036F TOBACCO NON-USER: CPT | Performed by: INTERNAL MEDICINE

## 2024-02-16 PROCEDURE — G8420 CALC BMI NORM PARAMETERS: HCPCS | Performed by: INTERNAL MEDICINE

## 2024-02-16 PROCEDURE — 99214 OFFICE O/P EST MOD 30 MIN: CPT | Performed by: INTERNAL MEDICINE

## 2024-02-16 PROCEDURE — 1123F ACP DISCUSS/DSCN MKR DOCD: CPT | Performed by: INTERNAL MEDICINE

## 2024-02-16 PROCEDURE — 3074F SYST BP LT 130 MM HG: CPT | Performed by: INTERNAL MEDICINE

## 2024-02-16 PROCEDURE — G8427 DOCREV CUR MEDS BY ELIG CLIN: HCPCS | Performed by: INTERNAL MEDICINE

## 2024-02-16 PROCEDURE — 3078F DIAST BP <80 MM HG: CPT | Performed by: INTERNAL MEDICINE

## 2024-02-16 PROCEDURE — 3023F SPIROM DOC REV: CPT | Performed by: INTERNAL MEDICINE

## 2024-02-16 RX ORDER — HYDROCHLOROTHIAZIDE 12.5 MG/1
12.5 CAPSULE, GELATIN COATED ORAL DAILY
COMMUNITY
Start: 2024-02-07

## 2024-02-16 NOTE — PROGRESS NOTES
changes of prior CABG.  - CHEST WALL/BONES: Left chest pacemaker. Postoperative of sternotomy wires.  Multilevel thoracic spondylosis  - UPPER ABDOMEN: Calcifications in the gallbladder likely secondary to  cholelithiasis. Small hiatal hernia. Fluid attenuating bilateral renal lesions  likely reflecting cysts. Punctate nonobstructing left renal calculus.    Impression  Solid and groundglass lesion in the left upper lobe is similar in size to prior  exam. Although, a few solid areas in this lesion appear more conspicuous,  suggesting slight progression biopsy-proven neoplasm. Other groundglass nodular  densities bilaterally do not appear significantly changed. Small bilateral  pleural effusions with adjacent atelectasis. Similar appearance of shotty  mediastinal lymph nodes      2/9/24      CT Jonelle 11/19/23      CT 6/12/23      Nuclear Medicine:   NM LUNG PERFUSION QUANT 01/14/2022    Narrative  Exam: NM LUNG PERFUSION KARTIK DIFF on 1/14/2022 9:13 AM    Clinical History: The Male patient is 81 years old presenting for left upper  lobe lung cancer, preoperative evaluation.    Comparison: Chest CT 12/17/2021    Radiopharmaceutical:  6.2 mCi Tc-99m MAA administered intravenously.    Technique: Anterior and posterior images in varying degrees of obliquity were  obtained for the perfusion study; perfusion studies was compared with a recent  chest x-ray.    Findings:    Perfusion: There is slightly heterogeneous perfusion of the lungs bilaterally,  however without evidence of segmental or subsegmental defect.    Impression  1. No evidence of significant perfusion defect.      PFTs:        No data to display              No results found for this or any previous visit. No results found for this or any previous visit.    FeNO: No results found for this or any previous visit.  FeNO and Likelihood of Eosinophilic Asthma   Unlikely Intermediate Likely   <25 ppb 25-50 ppb >50ppb     Exercise Oximetry:  12/19/23  Resting RA Sat

## 2024-02-20 ENCOUNTER — TELEPHONE (OUTPATIENT)
Dept: PULMONOLOGY | Age: 84
End: 2024-02-20

## 2024-02-20 DIAGNOSIS — D09.9 ADENOCARCINOMA IN SITU: ICD-10-CM

## 2024-02-20 DIAGNOSIS — R22.1 NECK MASS: ICD-10-CM

## 2024-02-20 DIAGNOSIS — R91.1 LUNG NODULE: Primary | ICD-10-CM

## 2024-02-20 NOTE — TELEPHONE ENCOUNTER
Radiology says that the 2 CT scans are needed to be resent with different codes they will not be covered with the diagnosis they currently are . Also the patient is wanting a call so that he knows it has been changed and he can schedule

## 2024-02-21 NOTE — TELEPHONE ENCOUNTER
Orders have been fixed with the correct diagnosis codes.  I called and spoke with the patient's spouse (Mile) to notify her that they are able to get these scans scheduled.  Mile verbalized understanding and did not have any further questions or concerns at this time. // Lissette DAMIAN

## 2024-03-05 ENCOUNTER — HOSPITAL ENCOUNTER (OUTPATIENT)
Dept: CT IMAGING | Age: 84
Discharge: HOME | End: 2024-03-08
Attending: INTERNAL MEDICINE
Payer: MEDICARE

## 2024-03-05 DIAGNOSIS — R22.1 NECK MASS: ICD-10-CM

## 2024-03-05 LAB — CREAT BLD-MCNC: 1.23 MG/DL (ref 0.8–1.5)

## 2024-03-05 PROCEDURE — 82565 ASSAY OF CREATININE: CPT

## 2024-03-05 PROCEDURE — 6360000004 HC RX CONTRAST MEDICATION: Performed by: INTERNAL MEDICINE

## 2024-03-05 PROCEDURE — 70491 CT SOFT TISSUE NECK W/DYE: CPT

## 2024-03-05 RX ORDER — ALBUTEROL SULFATE 90 UG/1
2 AEROSOL, METERED RESPIRATORY (INHALATION) EVERY 6 HOURS PRN
Qty: 18 G | Refills: 3 | Status: SHIPPED | OUTPATIENT
Start: 2024-03-05

## 2024-03-05 RX ADMIN — IOPAMIDOL 80 ML: 755 INJECTION, SOLUTION INTRAVENOUS at 08:56

## 2024-03-14 ENCOUNTER — TELEPHONE (OUTPATIENT)
Age: 84
End: 2024-03-14

## 2024-03-14 ENCOUNTER — TELEPHONE (OUTPATIENT)
Dept: PULMONOLOGY | Age: 84
End: 2024-03-14

## 2024-03-14 DIAGNOSIS — Z01.818 PREOP TESTING: Primary | ICD-10-CM

## 2024-03-14 DIAGNOSIS — R59.1 HEAD AND NECK LYMPHADENOPATHY: Primary | ICD-10-CM

## 2024-03-14 DIAGNOSIS — R59.1 HEAD AND NECK LYMPHADENOPATHY: ICD-10-CM

## 2024-03-14 NOTE — TELEPHONE ENCOUNTER
----- Message from Amari Doll MD sent at 3/13/2024  8:07 AM EDT -----  Can we let the patient know that his CT neck did show an enlarged lymph node in the area that we noticed was enlarged at his last appointment. Can we offer him an IR needle biopsy of this area to rule out cancer? Not sure if he will want this or not but should be offered.     Amari Doll MD    I have spoken with patient.  We have discussed results of CT neck per Dr Doll and recommendation for BX of enlarged LN, he is agreeable and allows me to place referral to IR for consideration.  He is aware that if IR approves, he will need lab work prior.  Orders are in for this.  He is also aware that I seek approval from Dr Doll to hold Eliquis x 48 hours which patient reports he takes due to A-Fib.

## 2024-03-14 NOTE — TELEPHONE ENCOUNTER
[facility] called to request a 48 hour Eliquis hold for the pt. States the pt needs to have a lymph biopsy but the date has not been set yet. Fax: [phone]

## 2024-03-14 NOTE — TELEPHONE ENCOUNTER
Called office of Dr Dumont and spoke with Janet. She will seek approval to hold Eliquis x 48 hours for LN biopsy not yet scheduled.

## 2024-03-14 NOTE — TELEPHONE ENCOUNTER
As long as he is on it for a fib that would be ok, but may want to check with cardiology.     Amari Doll MD

## 2024-03-15 DIAGNOSIS — R59.1 HEAD AND NECK LYMPHADENOPATHY: Primary | ICD-10-CM

## 2024-03-18 NOTE — TELEPHONE ENCOUNTER
Received fax from [facility] w/ noted approval to hold eliquis for 24-48 hours & restart post procedure, entered by Dr. Dumont. Documented in chart will send to scanning.

## 2024-03-19 NOTE — TELEPHONE ENCOUNTER
Gage Dumont MD   to Sameera Tovar MA     3/18/24  9:02 AM  Okay to hold for 24 to 48 hours and restart postprocedure.      I have spoken with patient.  He is aware that per Dr Dumont, ok to hold Eliquis x 48 hours prior to BX.  With BX on 3/21, patient will hold Eliquis at this time.

## 2024-03-21 ENCOUNTER — HOSPITAL ENCOUNTER (OUTPATIENT)
Dept: ULTRASOUND IMAGING | Age: 84
End: 2024-03-21
Attending: INTERNAL MEDICINE
Payer: MEDICARE

## 2024-03-21 VITALS
WEIGHT: 164 LBS | OXYGEN SATURATION: 96 % | HEART RATE: 70 BPM | DIASTOLIC BLOOD PRESSURE: 67 MMHG | BODY MASS INDEX: 22.96 KG/M2 | HEIGHT: 71 IN | RESPIRATION RATE: 18 BRPM | SYSTOLIC BLOOD PRESSURE: 119 MMHG | TEMPERATURE: 97.5 F

## 2024-03-21 DIAGNOSIS — R59.1 HEAD AND NECK LYMPHADENOPATHY: ICD-10-CM

## 2024-03-21 PROCEDURE — 88184 FLOWCYTOMETRY/ TC 1 MARKER: CPT

## 2024-03-21 PROCEDURE — 2500000003 HC RX 250 WO HCPCS: Performed by: PHYSICIAN ASSISTANT

## 2024-03-21 PROCEDURE — 76942 ECHO GUIDE FOR BIOPSY: CPT | Performed by: PHYSICIAN ASSISTANT

## 2024-03-21 PROCEDURE — 88305 TISSUE EXAM BY PATHOLOGIST: CPT

## 2024-03-21 PROCEDURE — 88185 FLOWCYTOMETRY/TC ADD-ON: CPT

## 2024-03-21 PROCEDURE — 76942 ECHO GUIDE FOR BIOPSY: CPT

## 2024-03-21 PROCEDURE — 38505 NEEDLE BIOPSY LYMPH NODES: CPT | Performed by: PHYSICIAN ASSISTANT

## 2024-03-21 RX ORDER — ONDANSETRON 4 MG/1
TABLET, FILM COATED ORAL
COMMUNITY
Start: 2024-02-05

## 2024-03-21 RX ORDER — HYDROCODONE BITARTRATE AND ACETAMINOPHEN 5; 325 MG/1; MG/1
TABLET ORAL
COMMUNITY
Start: 2024-02-05

## 2024-03-21 RX ORDER — LIDOCAINE HYDROCHLORIDE 20 MG/ML
INJECTION, SOLUTION INFILTRATION; PERINEURAL PRN
Status: COMPLETED | OUTPATIENT
Start: 2024-03-21 | End: 2024-03-21

## 2024-03-21 RX ADMIN — LIDOCAINE HYDROCHLORIDE 5 ML: 20 INJECTION, SOLUTION INFILTRATION; PERINEURAL at 11:04

## 2024-03-21 NOTE — OR NURSING
Recovery period without difficulty. Pt alert and oriented and denies pain. Dressing is clean, dry, and intact. Reviewed discharge instructions with patient, verbalized understanding. Pt escorted to lobby discharge area via wheelchair. Vital signs and Precious score completed.

## 2024-03-21 NOTE — DISCHARGE INSTRUCTIONS
If you have any questions about your procedure, please call the Interventional Radiology department at [phone].      After business hours (5pm) and weekends, call the answering service at [phone] and ask for the Radiologist on call to be paged.            Si tiene Preguntas acerca del procedimiento, por favor llame al departamento de Radiología Intervencional al [phone].      Después de horas de oficina (5 pm) y los fines de semana, llamar al servicio de llamadas al [phone] y pregunte por el Radiologo de raina.

## 2024-03-26 ENCOUNTER — TELEPHONE (OUTPATIENT)
Dept: PULMONOLOGY | Age: 84
End: 2024-03-26

## 2024-03-26 DIAGNOSIS — C83.11 MANTLE CELL LYMPHOMA OF LYMPH NODES OF NECK (HCC): Primary | ICD-10-CM

## 2024-03-26 NOTE — TELEPHONE ENCOUNTER
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  Dr Doll was contacted by pathology. Per Dr Doll, ok for triage to discuss results of pathology, schedule PET scan and refer patient to oncology. I have attempted to call patient and have left a message for a return call.

## 2024-03-28 ENCOUNTER — HOSPITAL ENCOUNTER (OUTPATIENT)
Dept: PET IMAGING | Age: 84
Discharge: HOME | End: 2024-03-28
Payer: MEDICARE

## 2024-03-28 DIAGNOSIS — C83.11 MANTLE CELL LYMPHOMA OF LYMPH NODES OF NECK (HCC): ICD-10-CM

## 2024-03-28 LAB
GLUCOSE BLD STRIP.AUTO-MCNC: 147 MG/DL (ref 65–100)
SERVICE CMNT-IMP: ABNORMAL

## 2024-03-28 PROCEDURE — 78815 PET IMAGE W/CT SKULL-THIGH: CPT

## 2024-03-28 PROCEDURE — A9609 HC RX DIAGNOSTIC RADIOPHARMACEUTICAL: HCPCS | Performed by: INTERNAL MEDICINE

## 2024-03-28 PROCEDURE — 2580000003 HC RX 258: Performed by: INTERNAL MEDICINE

## 2024-03-28 PROCEDURE — 82962 GLUCOSE BLOOD TEST: CPT

## 2024-03-28 PROCEDURE — 3430000000 HC RX DIAGNOSTIC RADIOPHARMACEUTICAL: Performed by: INTERNAL MEDICINE

## 2024-03-28 PROCEDURE — 6360000004 HC RX CONTRAST MEDICATION: Performed by: INTERNAL MEDICINE

## 2024-03-28 RX ORDER — SODIUM CHLORIDE 0.9 % (FLUSH) 0.9 %
10 SYRINGE (ML) INJECTION ONCE AS NEEDED
Status: COMPLETED | OUTPATIENT
Start: 2024-03-28 | End: 2024-03-28

## 2024-03-28 RX ORDER — FLUDEOXYGLUCOSE F 18 200 MCI/ML
11.72 INJECTION, SOLUTION INTRAVENOUS
Status: COMPLETED | OUTPATIENT
Start: 2024-03-28 | End: 2024-03-28

## 2024-03-28 RX ADMIN — DIATRIZOATE MEGLUMINE AND DIATRIZOATE SODIUM 10 ML: 660; 100 LIQUID ORAL; RECTAL at 10:56

## 2024-03-28 RX ADMIN — SODIUM CHLORIDE, PRESERVATIVE FREE 10 ML: 5 INJECTION INTRAVENOUS at 10:56

## 2024-03-28 RX ADMIN — FLUDEOXYGLUCOSE F 18 11.72 MILLICURIE: 200 INJECTION, SOLUTION INTRAVENOUS at 10:56

## 2024-04-02 ENCOUNTER — OFFICE VISIT (OUTPATIENT)
Dept: ONCOLOGY | Age: 84
End: 2024-04-02
Payer: MEDICARE

## 2024-04-02 ENCOUNTER — HOSPITAL ENCOUNTER (OUTPATIENT)
Dept: LAB | Age: 84
Discharge: HOME | End: 2024-04-05
Payer: MEDICARE

## 2024-04-02 ENCOUNTER — TELEPHONE (OUTPATIENT)
Dept: ONCOLOGY | Age: 84
End: 2024-04-02

## 2024-04-02 ENCOUNTER — CLINICAL DOCUMENTATION (OUTPATIENT)
Dept: ONCOLOGY | Age: 84
End: 2024-04-02

## 2024-04-02 VITALS
SYSTOLIC BLOOD PRESSURE: 128 MMHG | OXYGEN SATURATION: 96 % | TEMPERATURE: 98 F | HEIGHT: 71 IN | BODY MASS INDEX: 22.85 KG/M2 | DIASTOLIC BLOOD PRESSURE: 57 MMHG | HEART RATE: 85 BPM | RESPIRATION RATE: 16 BRPM | WEIGHT: 163.2 LBS

## 2024-04-02 DIAGNOSIS — R53.83 OTHER FATIGUE: ICD-10-CM

## 2024-04-02 DIAGNOSIS — C83.18 MANTLE CELL LYMPHOMA OF LYMPH NODES OF MULTIPLE REGIONS (HCC): ICD-10-CM

## 2024-04-02 DIAGNOSIS — C83.18 MANTLE CELL LYMPHOMA OF LYMPH NODES OF MULTIPLE REGIONS (HCC): Primary | ICD-10-CM

## 2024-04-02 DIAGNOSIS — D62 ANEMIA DUE TO ACUTE BLOOD LOSS: ICD-10-CM

## 2024-04-02 LAB
ALBUMIN SERPL-MCNC: 2.8 G/DL (ref 3.2–4.6)
ALBUMIN/GLOB SERPL: 0.9 (ref 0.4–1.6)
ALP SERPL-CCNC: 61 U/L (ref 50–136)
ALT SERPL-CCNC: 12 U/L (ref 12–65)
ANION GAP SERPL CALC-SCNC: 7 MMOL/L (ref 2–11)
AST SERPL-CCNC: 11 U/L (ref 15–37)
BASOPHILS # BLD: 0 K/UL (ref 0–0.2)
BASOPHILS NFR BLD: 0 % (ref 0–2)
BILIRUB SERPL-MCNC: 0.3 MG/DL (ref 0.2–1.1)
BUN SERPL-MCNC: 112 MG/DL (ref 8–23)
CALCIUM SERPL-MCNC: 8.8 MG/DL (ref 8.3–10.4)
CHLORIDE SERPL-SCNC: 111 MMOL/L (ref 103–113)
CO2 SERPL-SCNC: 22 MMOL/L (ref 21–32)
CREAT SERPL-MCNC: 1.7 MG/DL (ref 0.8–1.5)
DIFFERENTIAL METHOD BLD: ABNORMAL
EOSINOPHIL # BLD: 0 K/UL (ref 0–0.8)
EOSINOPHIL NFR BLD: 0 % (ref 0.5–7.8)
ERYTHROCYTE [DISTWIDTH] IN BLOOD BY AUTOMATED COUNT: 14 % (ref 11.9–14.6)
GLOBULIN SER CALC-MCNC: 3 G/DL (ref 2.8–4.5)
GLUCOSE SERPL-MCNC: 373 MG/DL (ref 65–100)
HAV IGM SER QL: NONREACTIVE
HBV CORE IGM SER QL: NONREACTIVE
HBV SURFACE AG SER QL: NONREACTIVE
HCT VFR BLD AUTO: 21.1 % (ref 41.1–50.3)
HCV AB SER QL: NONREACTIVE
HGB BLD-MCNC: 6.6 G/DL (ref 13.6–17.2)
HIV 1+2 AB+HIV1 P24 AG SERPL QL IA: NONREACTIVE
HIV 1/2 RESULT COMMENT: NORMAL
IMM GRANULOCYTES # BLD AUTO: 0 K/UL (ref 0–0.5)
IMM GRANULOCYTES NFR BLD AUTO: 0 % (ref 0–5)
LDH SERPL L TO P-CCNC: 163 U/L (ref 110–210)
LYMPHOCYTES # BLD: 2.4 K/UL (ref 0.5–4.6)
LYMPHOCYTES NFR BLD: 31 % (ref 13–44)
MCH RBC QN AUTO: 29.2 PG (ref 26.1–32.9)
MCHC RBC AUTO-ENTMCNC: 31.3 G/DL (ref 31.4–35)
MCV RBC AUTO: 93.4 FL (ref 82–102)
MONOCYTES # BLD: 0.4 K/UL (ref 0.1–1.3)
MONOCYTES NFR BLD: 5 % (ref 4–12)
NEUTS SEG # BLD: 4.9 K/UL (ref 1.7–8.2)
NEUTS SEG NFR BLD: 64 % (ref 43–78)
NRBC # BLD: 0 K/UL (ref 0–0.2)
PLATELET # BLD AUTO: 135 K/UL (ref 150–450)
PMV BLD AUTO: 9.6 FL (ref 9.4–12.3)
POTASSIUM SERPL-SCNC: 4.8 MMOL/L (ref 3.5–5.1)
PROT SERPL-MCNC: 5.8 G/DL (ref 6.3–8.2)
RBC # BLD AUTO: 2.26 M/UL (ref 4.23–5.6)
SODIUM SERPL-SCNC: 140 MMOL/L (ref 136–146)
WBC # BLD AUTO: 7.7 K/UL (ref 4.3–11.1)

## 2024-04-02 PROCEDURE — G8427 DOCREV CUR MEDS BY ELIG CLIN: HCPCS | Performed by: INTERNAL MEDICINE

## 2024-04-02 PROCEDURE — 36415 COLL VENOUS BLD VENIPUNCTURE: CPT

## 2024-04-02 PROCEDURE — 1036F TOBACCO NON-USER: CPT | Performed by: INTERNAL MEDICINE

## 2024-04-02 PROCEDURE — 83615 LACTATE (LD) (LDH) ENZYME: CPT

## 2024-04-02 PROCEDURE — 85025 COMPLETE CBC W/AUTO DIFF WBC: CPT

## 2024-04-02 PROCEDURE — 1123F ACP DISCUSS/DSCN MKR DOCD: CPT | Performed by: INTERNAL MEDICINE

## 2024-04-02 PROCEDURE — 82232 ASSAY OF BETA-2 PROTEIN: CPT

## 2024-04-02 PROCEDURE — 80074 ACUTE HEPATITIS PANEL: CPT

## 2024-04-02 PROCEDURE — 80053 COMPREHEN METABOLIC PANEL: CPT

## 2024-04-02 PROCEDURE — G8420 CALC BMI NORM PARAMETERS: HCPCS | Performed by: INTERNAL MEDICINE

## 2024-04-02 PROCEDURE — 99205 OFFICE O/P NEW HI 60 MIN: CPT | Performed by: INTERNAL MEDICINE

## 2024-04-02 PROCEDURE — 87389 HIV-1 AG W/HIV-1&-2 AB AG IA: CPT

## 2024-04-02 NOTE — PATIENT INSTRUCTIONS
Patient Information from Today's Visit        Treatment Summary has been discussed and given to patient:N/A  Follow Up: As Scheduled    Please refer to After Visit Summary or [facility] for upcoming appointment information. If you have any questions regarding your upcoming schedule please reach out to your care team through [facility] or call [phone].    PLAN:  - Your recent biopsy showed Mantle Cell lymphoma: this is a type of non-hodgkin lymphoma  - we want to get some tests done to see how aggressive this lymphoma is  - we want to get a bone marrow biopsy done as well  - we will get some lab work done today   -------------------------------------------------------------------------------------------------------------------  Please call our office at [phone] if you have any  of the following symptoms:   Fever of 100.5 or greater  Chills  Shortness of breath  Swelling or pain in one leg    After office hours an answering service is available and will contact a provider for emergencies or if you are experiencing any of the above symptoms.    Patient did express an interest in My Chart.  My Chart log in information explained on the after visit summary printout at the check-out desk.    KATELYN PortilloN, RN  Hematology Navigator

## 2024-04-02 NOTE — PROGRESS NOTES
[place] [id number]  Phone: [phone]        4/2/2024  Nicolas Campos  1940  [id number]      Nicolas Campos is an 84 year old  man who has been sent to my clinic by Dr. Amari Doll. In 3/2024 he was found to have NHL, Mantle Cell Lymphoma, Stage III-A.      ALLERGIES:   No known drug allergies.      FAMILY HISTORY:    No hematologic disorders.      SOCIAL HISTORY:    He is  and lives with his wife. He used to work as a . He stopped smoking cigarettes in 1/2007.      PAST MEDICAL HISTORY:    Hypertension, TIA, Atrial Fibrillation, s/p AAA repair, Peripheral Vascular Disease, COPD, Diabetes Mellitus, Coronary Artery Disease, s/p CABG, Cardiomyopathy, s/p PPM, left lung cancer and NHL.      ROS:  The patient complained of fatigue, exertional dyspnea and melena; all other systems negative.       PHYSICAL EXAM:   The patient was alert, awake and oriented, no acute distress was noted, a left infra-clavicular PPM was present. Oral examination revealed dentures, no mucosal lesions were seen. Lymph node examination revealed left cervical adenopathy. Neck examination revealed a supple neck, no thyromegaly was noted. Chest examination revealed bilateral basal crepitations and prolonged expiration bilaterally. Heart examination revealed S-1 and S-2 with a 2/6 systolic murmur. Abdominal examination revealed a non-tender abdomen, bowel sounds were positive, no organomegaly could be appreciated. Examination of the extremities did not reveal any tenderness or erythema. Examination of the skin did not reveal any lesions.      KPS:  50.      LABORATORY INVESTIGATIONS:  CBC showed a WBC count of 7.7, ANC was 4.9, Hemoglobin was 6.6 and Platelets were 135. Medical problems and test results were reviewed with the patient today.      ASSESSMENT:    NHL, Mantle Cell Lymphoma, Stage III-A; Anemia due to acute blood loss. I spent a total of 62

## 2024-04-02 NOTE — PROGRESS NOTES
NEW PATIENT INTAKE    Referral Diagnosis: Mantle cell lymphoma of lymph nodes of neck    Referring Provider: Amari Doll M    Primary Care Provider: Dashawn Marks Jr., MD    Family History of Cancer/ Hematology Disorders: Maternal grandmother with unspecified type of cancer    Presenting Symptoms: Palpable lesion along patient's left neck    Chronological History of Pertinent Events: Mr. Campos is an 84-year-old white male referred for mantle cell lymphoma of lymph nodes of the neck. He is followed by Pulmonologist, Dr. Amari Doll, for history of COPD. CT scan in 2021 showed GGO. 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 This has been monitored with stable scans.     2/9/24: CT CHEST: Solid and groundglass lesion in the left upper lobe is similar in size to prior exam. Although, a few solid areas in this lesion appear more conspicuous, suggesting slight progression biopsy-proven neoplasm. Other groundglass nodular densities bilaterally do not appear significantly changed. Small bilateral pleural effusions with adjacent atelectasis. Similar appearance of shotty mediastinal lymph nodes (Epic/Imaging)    2/16/24: F/u with Dr. Doll   -Pt reports discomfort/tightness in the center of his chest on inhalation   -Physical exam revealed a new palpable lesion along patient's left neck  -CT neck with contrast ordered for further evaluation of palpable neck mass  -Continue Trelegy and albuterol; Repeat CT chest in 6 months  -MD notes, “Cont to favor conservative approach to this relatively stable adeno in situ at his age”.     3/5/24: CT SOFT TISSUE NECK W CONTRAST: 3.4 cm abnormal lymph node corresponding to the left neck palpable area concern. Differential diagnosis includes metastatic disease versus lymphoma versus reactive infectious/inflammatory etiologies. Please differentiate clinically. Partially seen left upper lobe known groundglass nodular mass. Chronic findings

## 2024-04-02 NOTE — PROGRESS NOTES
Critical lab of hgb 6.6 received from laboratory, read back and verified. Provider and Clinical support staff notified of critical lab value needing to be addressed at today's visit and receipt confirmed.

## 2024-04-02 NOTE — TELEPHONE ENCOUNTER
Call to patient regarding low hemoglobin. Explained to patient that we have received lab work after his visit with  and he will need to go to the closest ER for a blood transfusion due to hemoglobin being 6.6. Patient and patients wife verbalized understanding and stated they would go to [facility]. Asked patient to please call our office if had any questions or concerns.

## 2024-04-04 LAB — B2 MICROGLOB SERPL-MCNC: 7.2 MG/L (ref 0.6–2.4)

## 2024-04-08 ENCOUNTER — TELEPHONE (OUTPATIENT)
Dept: RADIATION ONCOLOGY | Age: 84
End: 2024-04-08

## 2024-04-09 ENCOUNTER — TELEPHONE (OUTPATIENT)
Dept: ONCOLOGY | Age: 84
End: 2024-04-09

## 2024-04-09 NOTE — TELEPHONE ENCOUNTER
Physician provider: Marcin Turcios MD  Reason for today's call: Referral  Last office visit: n/a    Pt's Spouse called asking to have referral sent to [place] for future treatment due to difficulty traveling to and from [place]. Per Spouse referral was to be sent 04.08.24, but the [facility] for [facility] Lorenzo stated they have not received it. Pt's Spouse wanted to F/U stating they feel like they are \"in limbo.\" The fax number for his office is: [phone].

## 2024-04-10 ENCOUNTER — TELEPHONE (OUTPATIENT)
Dept: ONCOLOGY | Age: 84
End: 2024-04-10

## 2024-04-10 DIAGNOSIS — C83.18 MANTLE CELL LYMPHOMA OF LYMPH NODES OF MULTIPLE REGIONS (HCC): Primary | ICD-10-CM

## 2024-04-10 NOTE — TELEPHONE ENCOUNTER
Physician provider: Marcin Turcios MD  Reason for today's call: Referral clarity  Last office visit: n/a     Pt called stating he requested a referral to Dr Lorenzo in [place]. When he called that office and was told Dr Turcios wanted him to see Dr Sommer in [place]. Pt is asking why he was given this information and wants to have a call back to discuss where he is supposed to go. Pt is asking for clarity on this matter. Pt stated he has been trying to get this issue resolved since Monday 04.08.24.

## 2024-04-10 NOTE — TELEPHONE ENCOUNTER
Returned call to patient and wife.  Informed of referral to Dr. Sommer in [place].  They are requesting a call from Dr. Turcios's team for rationale.  Will forward message.

## 2024-04-15 ENCOUNTER — TELEPHONE (OUTPATIENT)
Dept: PULMONOLOGY | Age: 84
End: 2024-04-15

## 2024-04-15 ENCOUNTER — TELEPHONE (OUTPATIENT)
Dept: GASTROENTEROLOGY | Age: 84
End: 2024-04-15

## 2024-04-15 NOTE — TELEPHONE ENCOUNTER
Left patient message via voicemail to please give me a call as soon as they are available. // Lissette White M.A.

## 2024-04-15 NOTE — TELEPHONE ENCOUNTER
Called patient to schedule colonoscopy, pt declined and has requested a gastroenterologist close to his home

## 2024-04-15 NOTE — TELEPHONE ENCOUNTER
----- Message from Amari Doll MD sent at 4/10/2024  9:46 AM EDT -----  Can we let the patient know that his 2L O2 at night does not appear to be adequate and he should increase to 3L if we can update his order. Thank you.     Amari Doll MD

## 2024-04-16 ENCOUNTER — TELEPHONE (OUTPATIENT)
Dept: PULMONOLOGY | Age: 84
End: 2024-04-16

## 2024-04-18 NOTE — TELEPHONE ENCOUNTER
Spoke with the patient in regards to their Overnight results, explained per Dr. Doll that there was still desaturation on 2 lpm qhs and that he would like to increase the oxygen to 3 lpm qhs.  Patient understood the results and was agreeable with this and a order has been sent via [facility] to [facility].  No further questions or concerns were asked at this time.   // Lissette White M.A.

## 2024-05-07 ENCOUNTER — TELEPHONE (OUTPATIENT)
Dept: ONCOLOGY | Age: 84
End: 2024-05-07

## 2024-05-08 ENCOUNTER — OFFICE VISIT (OUTPATIENT)
Age: 84
End: 2024-05-08
Payer: MEDICARE

## 2024-05-08 VITALS
SYSTOLIC BLOOD PRESSURE: 124 MMHG | HEIGHT: 71 IN | HEART RATE: 88 BPM | DIASTOLIC BLOOD PRESSURE: 48 MMHG | BODY MASS INDEX: 23.38 KG/M2 | WEIGHT: 167 LBS

## 2024-05-08 DIAGNOSIS — I48.21 PERMANENT ATRIAL FIBRILLATION (HCC): Primary | ICD-10-CM

## 2024-05-08 DIAGNOSIS — Z79.01 ANTICOAGULANT LONG-TERM USE: ICD-10-CM

## 2024-05-08 DIAGNOSIS — I42.8 OTHER CARDIOMYOPATHY (HCC): ICD-10-CM

## 2024-05-08 PROCEDURE — 1123F ACP DISCUSS/DSCN MKR DOCD: CPT | Performed by: INTERNAL MEDICINE

## 2024-05-08 PROCEDURE — G8420 CALC BMI NORM PARAMETERS: HCPCS | Performed by: INTERNAL MEDICINE

## 2024-05-08 PROCEDURE — 1036F TOBACCO NON-USER: CPT | Performed by: INTERNAL MEDICINE

## 2024-05-08 PROCEDURE — G8427 DOCREV CUR MEDS BY ELIG CLIN: HCPCS | Performed by: INTERNAL MEDICINE

## 2024-05-08 PROCEDURE — 99214 OFFICE O/P EST MOD 30 MIN: CPT | Performed by: INTERNAL MEDICINE

## 2024-05-08 ASSESSMENT — ENCOUNTER SYMPTOMS
BLOATING: 0
VOMITING: 0
SHORTNESS OF BREATH: 0
ABDOMINAL PAIN: 0
NAUSEA: 0
HEMOPTYSIS: 0
BLURRED VISION: 0
DOUBLE VISION: 0
BACK PAIN: 0
COUGH: 0
ORTHOPNEA: 0

## 2024-05-08 NOTE — PROGRESS NOTES
[facility] CARDIOLOGY  [address] [place] [address], [address]  [place] [address]  PHONE: [phone]    24    NAME:  Nicolas Campos  : 1940  MRN: [id number]         SUBJECTIVE:   Nicolas Campos is a 84 y.o. male seen for a visit regarding the following:     Chief Complaint   Patient presents with    Atrial Fibrillation       HPI:  (prior [facility] pt)    History of coronary disease with prior CABG [no records available; at [facility]].  Other history of AAA status post EVAR; appears underwent endovascular treatment of type II endoleak from 2016.  Also stated permanent atrial fibrillation with prior CRT-P placement (underwent generator exchange from 2022; ~92% paced).  Last on echocardiogram from 2022 with ejection fraction stated at 45-50%.  Prior noted negative Cardiolite from 2017; EF stated 57%. Also DM, HTN, HLP.  Echo from 2022 with preserved EF and basal inferior/inferolateral wall motion abnormality; severe left atrial enlargement with moderate MR, RVSP at 53mmHg.  Recent diagnosis of mantle cell lymphoma from 3/2024 (appears also with GI tract involvement) and currently seeing oncology.    Noted admission at [facility] from 2024 with significant anemia at 6.6 with suspicion for GI bleed and underwent EGD with biopsy with results not available; stated duodenitis at the time and appears Eliquis was resumed.  Last noted hemoglobin at around 7.8 from 4/10/2024.  Denies any bleeding issues.  Upcoming evaluation with oncology.    Prior--States mostly impeded with FRANCISCO with COPD.  Stable symptoms.  Denies any chest discomfort.  No PND/orthopnea.  No significant weight gain.  Can walk for about a block on flat ground with his cane; some balance issues.  Well-controlled home BPs.  Tolerating current medications.  No issues with anticoagulation.  Some fatigue.  Last device check reviewed and okay [pacing over 95%].    Coronary disease-stable, atrial fibrillation-not controlled,

## 2024-06-08 ENCOUNTER — PATIENT MESSAGE (OUTPATIENT)
Age: 84
End: 2024-06-08

## 2024-06-10 NOTE — TELEPHONE ENCOUNTER
From: Nicolas Campos  To: Dr. Gage Dumont  Sent: 6/8/2024 9:55 AM EDT  Subject: Eliquis    Please issue a new prescription for the above.    send prescription to [facility]    Thank you......Nicolas Campos

## 2024-07-08 ENCOUNTER — NURSE ONLY (OUTPATIENT)
Age: 84
End: 2024-07-08

## 2024-07-08 DIAGNOSIS — I42.8 OTHER CARDIOMYOPATHY (HCC): ICD-10-CM

## 2024-07-08 DIAGNOSIS — I48.92 ATRIAL FLUTTER, CHRONIC (HCC): Primary | ICD-10-CM

## 2024-08-05 RX ORDER — ALBUTEROL SULFATE 90 UG/1
2 AEROSOL, METERED RESPIRATORY (INHALATION) EVERY 6 HOURS PRN
Qty: 18 G | Refills: 6 | Status: SHIPPED | OUTPATIENT
Start: 2024-08-05

## 2024-08-30 ENCOUNTER — PATIENT MESSAGE (OUTPATIENT)
Dept: ORTHOPEDIC SURGERY | Age: 84
End: 2024-08-30

## 2024-08-30 ENCOUNTER — TELEPHONE (OUTPATIENT)
Dept: ORTHOPEDIC SURGERY | Age: 84
End: 2024-08-30

## 2024-08-30 NOTE — TELEPHONE ENCOUNTER
Please advise how to schedule:      Appointment Request  (Newest Message First)  Nicolas RODRIGUEZ [facility]  Staff2 hours ago (10:24 AM)       Appointment Request From: Nicolas Campos     With Provider: Dr. Dayday Bonilla MD [[facility]]     Preferred Date Range: 8/30/2024 - 9/3/2024     Preferred Times: Any Time     Reason for visit: Request an Appointment     Comments:  Big toe left foot......very stiff.... painful.....turning red ......

## 2024-09-16 ENCOUNTER — OFFICE VISIT (OUTPATIENT)
Dept: ORTHOPEDIC SURGERY | Age: 84
End: 2024-09-16
Payer: MEDICARE

## 2024-09-16 VITALS — BODY MASS INDEX: 23.38 KG/M2 | WEIGHT: 167 LBS | HEIGHT: 71 IN

## 2024-09-16 DIAGNOSIS — M25.472 EDEMA OF LEFT ANKLE: Primary | ICD-10-CM

## 2024-09-16 DIAGNOSIS — M25.572 ACUTE LEFT ANKLE PAIN: ICD-10-CM

## 2024-09-16 PROCEDURE — G8420 CALC BMI NORM PARAMETERS: HCPCS | Performed by: ORTHOPAEDIC SURGERY

## 2024-09-16 PROCEDURE — M5025 MISC INCREDIWEAR BOOT SLEEVE: HCPCS | Performed by: ORTHOPAEDIC SURGERY

## 2024-09-16 PROCEDURE — 1123F ACP DISCUSS/DSCN MKR DOCD: CPT | Performed by: ORTHOPAEDIC SURGERY

## 2024-09-16 PROCEDURE — 99213 OFFICE O/P EST LOW 20 MIN: CPT | Performed by: ORTHOPAEDIC SURGERY

## 2024-09-16 PROCEDURE — G8428 CUR MEDS NOT DOCUMENT: HCPCS | Performed by: ORTHOPAEDIC SURGERY

## 2024-09-16 PROCEDURE — 1036F TOBACCO NON-USER: CPT | Performed by: ORTHOPAEDIC SURGERY

## 2024-09-19 PROCEDURE — 93296 REM INTERROG EVL PM/IDS: CPT | Performed by: INTERNAL MEDICINE

## 2024-09-19 PROCEDURE — 93294 REM INTERROG EVL PM/LDLS PM: CPT | Performed by: INTERNAL MEDICINE

## 2024-09-30 RX ORDER — DILTIAZEM HYDROCHLORIDE 240 MG/1
240 CAPSULE, COATED, EXTENDED RELEASE ORAL DAILY
Qty: 90 CAPSULE | Refills: 3 | Status: SHIPPED | OUTPATIENT
Start: 2024-09-30

## 2024-09-30 NOTE — TELEPHONE ENCOUNTER
Requested Prescriptions     Pending Prescriptions Disp Refills    dilTIAZem (CARDIZEM CD) 240 MG extended release capsule [Pharmacy Med Name: DILTIAZEM CD 240MG CAPSULES (24 HR)] 90 capsule 3     Sig: TAKE 1 CAPSULE BY MOUTH DAILY     Verified rx in last OV date 05/08/24. Pharmacy confirmed. Erx as requested.

## 2024-10-09 ENCOUNTER — TELEPHONE (OUTPATIENT)
Age: 84
End: 2024-10-09

## 2024-10-09 RX ORDER — DILTIAZEM HYDROCHLORIDE 240 MG/1
240 CAPSULE, COATED, EXTENDED RELEASE ORAL DAILY
Qty: 90 CAPSULE | Refills: 3 | Status: SHIPPED | OUTPATIENT
Start: 2024-10-09

## 2024-10-09 NOTE — TELEPHONE ENCOUNTER
MEDICATION REFILL REQUEST      Name of Medication:  Diltiazem  Dose:  240 mg  Frequency:  QD  Quantity:  90  Days' supply:  90 with 3 refills      Pharmacy Name/Location:  [phone]  
Verified rx in last OV date 05/08/24. Pharmacy confirmed. Erx as requested.    Requested Prescriptions     Signed Prescriptions Disp Refills    dilTIAZem (CARDIZEM CD) 240 MG extended release capsule 90 capsule 3     Sig: Take 1 capsule by mouth daily     Authorizing Provider: DARIEN CHASE     Ordering User: MARLEN LEGER      
decreased weight-shifting ability

## 2024-10-10 ENCOUNTER — TELEPHONE (OUTPATIENT)
Dept: PULMONOLOGY | Age: 84
End: 2024-10-10

## 2024-10-10 NOTE — TELEPHONE ENCOUNTER
Last seen: 2/16/24  Hx: COPD, JUANCHO adeno    April increased HS O2 to 3L.    Patient call reporting COPD flare up, very sob, wearing O2 continuously at 2L w/ no sense of relief.  Able to reach patient to discuss sob, started about 5 days ago, some wheezing, no edema; albuterol neb helps a little bit, using every 2-3 hours; confirmed wearing O2 @ 2L continuous; able to measure sats, when he finds it is low uses O2 tank on 2L and sees immediate improvement; when he is using concentrator sats improve slowly and stall in the mid 80s. Concentrator was checked yesterday by supplier was told it is working fine.   He is in a program, a test case for cancer, 6 series test, has been in 6th series for last month, this is for mantle cell lymphoma. Some side effects like fatigue, anemia, low blood cell count, nausea & diarrhea. Hgb 8/28 9.8.  Has appt w/ Dr. Doll 10/16. He will turn O2 up to 3L.  Any other interventions prior to appt?

## 2024-10-10 NOTE — TELEPHONE ENCOUNTER
Patient says he is having a COPD flare up . Says he is very SOB , has no  appetite . He is using 2 l continious oxygen but is not really helping .

## (undated) DEVICE — KENDALL SCD EXPRESS SLEEVES, KNEE LENGTH, MEDIUM: Brand: KENDALL SCD

## (undated) DEVICE — 1010 S-DRAPE TOWEL DRAPE 10/BX: Brand: STERI-DRAPE™

## (undated) DEVICE — SET EXTN L6IN W/ S STL CLMP

## (undated) DEVICE — DURASEAL® DURAL SEALANT SYSTEM 5ML 5 PACK: Brand: DURASEAL®

## (undated) DEVICE — STANDARD HYPODERMIC NEEDLE,POLYPROPYLENE HUB: Brand: MONOJECT

## (undated) DEVICE — PACK PROCEDURE SURG POST LAMINECTOMY CDS

## (undated) DEVICE — KENDALL RADIOLUCENT FOAM MONITORING ELECTRODE RECTANGULAR SHAPE: Brand: KENDALL

## (undated) DEVICE — SUTURE VCRL SZ 3-0 L27IN ABSRB UD L36MM CT-1 1/2 CIR J258H

## (undated) DEVICE — SINGLE USE SUCTION VALVE MAJ-209: Brand: SINGLE USE SUCTION VALVE (STERILE)

## (undated) DEVICE — BRUSH CYTO FLX DISP SUPERDIMENSION

## (undated) DEVICE — SYR 10ML LUER LOK 1/5ML GRAD --

## (undated) DEVICE — 3M™ TEGADERM™ TRANSPARENT FILM DRESSING FRAME STYLE, 1628, 6 IN X 8 IN (15 CM X 20 CM), 10/CT 8CT/CASE: Brand: 3M™ TEGADERM™

## (undated) DEVICE — DRAPE TWL SURG 16X26IN BLU ORB04] ALLCARE INC]

## (undated) DEVICE — Device

## (undated) DEVICE — SOLUTION IV 1000ML 0.9% SOD CHL

## (undated) DEVICE — AGENT HEMSTAT W3XL4IN OXIDIZED REGENERATED CELOS ABSRB FOR

## (undated) DEVICE — INTENDED FOR TISSUE SEPARATION, AND OTHER PROCEDURES THAT REQUIRE A SHARP SURGICAL BLADE TO PUNCTURE OR CUT.: Brand: BARD-PARKER SAFETY BLADES SIZE 15, STERILE

## (undated) DEVICE — SINGLE USE BIOPSY VALVE MAJ-210: Brand: SINGLE USE BIOPSY VALVE (STERILE)

## (undated) DEVICE — KIT PROC W/ 90DEG FIRM TIP EXT WRK CHN LOCATABLE GUID FOR

## (undated) DEVICE — GOWN,PREVENTION PLUS,2XL,ST,22/CS: Brand: MEDLINE

## (undated) DEVICE — SOL INJ SOD CL0.9% 10ML PREFIL --

## (undated) DEVICE — FORCEPS BX WRK L110MM CHN L2MM DIA1.7MM SUPERDIMENSION

## (undated) DEVICE — WAX SURG 2.5GM HEMSTAT BNE BEESWAX PARAFFIN ISO PALMITATE

## (undated) DEVICE — Device: Brand: BALLOON

## (undated) DEVICE — (D)PREP SKN CHLRAPRP APPL 26ML -- CONVERT TO ITEM 371833

## (undated) DEVICE — 2000CC GUARDIAN II: Brand: GUARDIAN

## (undated) DEVICE — AGENT HEMSTAT 8ML FLX TIP MTRX + DISP SURGIFLO

## (undated) DEVICE — BIPOLAR SEALER 23-113-1 AQM 2.3 OM NEURO: Brand: AQUAMANTYS ®

## (undated) DEVICE — SINGLE USE ASPIRATION NEEDLE: Brand: SINGLE USE ASPIRATION NEEDLE

## (undated) DEVICE — BRONCHOSCOPY PACK: Brand: MEDLINE INDUSTRIES, INC.

## (undated) DEVICE — CANNULA NSL ORAL AD FOR CAPNOFLEX CO2 O2 AIRLFE

## (undated) DEVICE — REM POLYHESIVE ADULT PATIENT RETURN ELECTRODE: Brand: VALLEYLAB

## (undated) DEVICE — BRUSH CYTO L120MM DIA1.7MM SHARPENED NDL TIP FWD FACING

## (undated) DEVICE — ADHESIVE SKIN CLOSURE 4X22 CM PREMIERPRO EXOFINFUSION DISP

## (undated) DEVICE — PATCH SENS PT FOR ELECTROMAGNETIC NAVIGATION BRONCHSCP SYS

## (undated) DEVICE — DRESSING POSTOP AG PRISMASEAL 3.5X6IN

## (undated) DEVICE — MOUTHPIECE ENDOSCP 20X27MM --

## (undated) DEVICE — SUTURE VCRL VIO BR 0 18IN C/R M04 J701D

## (undated) DEVICE — SUTURE VCRL + SZ 3-0 L18IN ABSRB UD SH 1/2 CIR TAPERCUT NDL VCP864D

## (undated) DEVICE — DRSG AQUACEL SURG 3.5X6IN -- CONVERT TO ITEM 369227

## (undated) DEVICE — PLASMABLADE X PS210-030S-LIGHT 3.0SL: Brand: PLASMABLADE™ X